# Patient Record
Sex: MALE | Race: WHITE | NOT HISPANIC OR LATINO | Employment: OTHER | URBAN - METROPOLITAN AREA
[De-identification: names, ages, dates, MRNs, and addresses within clinical notes are randomized per-mention and may not be internally consistent; named-entity substitution may affect disease eponyms.]

---

## 2017-11-14 ENCOUNTER — HISTORICAL (OUTPATIENT)
Dept: ENDOSCOPY | Facility: HOSPITAL | Age: 57
End: 2017-11-14

## 2017-11-14 LAB — CRC RECOMMENDATION EXT: NORMAL

## 2018-04-20 ENCOUNTER — HISTORICAL (OUTPATIENT)
Dept: RADIOLOGY | Facility: HOSPITAL | Age: 58
End: 2018-04-20

## 2018-04-23 ENCOUNTER — HISTORICAL (OUTPATIENT)
Dept: RADIOLOGY | Facility: HOSPITAL | Age: 58
End: 2018-04-23

## 2018-04-27 ENCOUNTER — HISTORICAL (OUTPATIENT)
Dept: RADIOLOGY | Facility: HOSPITAL | Age: 58
End: 2018-04-27

## 2018-05-04 ENCOUNTER — HISTORICAL (OUTPATIENT)
Dept: RADIOLOGY | Facility: HOSPITAL | Age: 58
End: 2018-05-04

## 2018-09-26 ENCOUNTER — HISTORICAL (OUTPATIENT)
Dept: RADIOLOGY | Facility: HOSPITAL | Age: 58
End: 2018-09-26

## 2018-09-26 LAB
ALBUMIN SERPL-MCNC: 4.7 GM/DL (ref 3.4–5)
ALBUMIN/GLOB SERPL: 1 RATIO (ref 1–2)
ALP SERPL-CCNC: 106 UNIT/L (ref 45–117)
ALT SERPL-CCNC: 56 UNIT/L (ref 12–78)
AST SERPL-CCNC: 33 UNIT/L (ref 15–37)
BILIRUB SERPL-MCNC: 0.8 MG/DL (ref 0.2–1)
BILIRUBIN DIRECT+TOT PNL SERPL-MCNC: 0.2 MG/DL
BILIRUBIN DIRECT+TOT PNL SERPL-MCNC: 0.6 MG/DL
BUN SERPL-MCNC: 12 MG/DL (ref 7–18)
CALCIUM SERPL-MCNC: 9 MG/DL (ref 8.5–10.1)
CHLORIDE SERPL-SCNC: 107 MMOL/L (ref 98–107)
CHOLEST SERPL-MCNC: 182 MG/DL
CHOLEST/HDLC SERPL: 4.7 {RATIO} (ref 0–5)
CO2 SERPL-SCNC: 27 MMOL/L (ref 21–32)
CREAT SERPL-MCNC: 0.8 MG/DL (ref 0.6–1.3)
GLOBULIN SER-MCNC: 3.4 GM/ML (ref 2.3–3.5)
GLUCOSE SERPL-MCNC: 111 MG/DL (ref 74–106)
H PYLORI AB SER IA-ACNC: POSITIVE
HDLC SERPL-MCNC: 39 MG/DL
LDLC SERPL CALC-MCNC: 108 MG/DL (ref 0–130)
POTASSIUM SERPL-SCNC: 3.9 MMOL/L (ref 3.5–5.1)
PROT SERPL-MCNC: 8.1 GM/DL (ref 6.4–8.2)
SODIUM SERPL-SCNC: 142 MMOL/L (ref 136–145)
TRIGL SERPL-MCNC: 176 MG/DL
VLDLC SERPL CALC-MCNC: 35 MG/DL

## 2018-11-02 ENCOUNTER — HISTORICAL (OUTPATIENT)
Dept: LAB | Facility: HOSPITAL | Age: 58
End: 2018-11-02

## 2018-11-02 LAB
FT4I SERPL CALC-MCNC: 2.08
PSA SERPL-MCNC: 3.24 NG/ML (ref 0–4)
T3RU NFR SERPL: 31 % (ref 31–39)
T4 SERPL-MCNC: 6.7 MCG/DL (ref 4.7–13.3)
TSH SERPL-ACNC: 1.75 MIU/ML (ref 0.36–3.74)

## 2018-11-06 ENCOUNTER — HISTORICAL (OUTPATIENT)
Dept: RADIOLOGY | Facility: HOSPITAL | Age: 58
End: 2018-11-06

## 2018-11-14 ENCOUNTER — HISTORICAL (OUTPATIENT)
Dept: RADIOLOGY | Facility: HOSPITAL | Age: 58
End: 2018-11-14

## 2018-12-26 ENCOUNTER — HISTORICAL (OUTPATIENT)
Dept: LAB | Facility: HOSPITAL | Age: 58
End: 2018-12-26

## 2018-12-26 LAB
ABS NEUT (OLG): 5.67 X10(3)/MCL (ref 2.1–9.2)
ALBUMIN SERPL-MCNC: 4.3 GM/DL (ref 3.4–5)
ALBUMIN/GLOB SERPL: 1.3 RATIO (ref 1.1–2)
ALP SERPL-CCNC: 119 UNIT/L (ref 50–136)
ALT SERPL-CCNC: 29 UNIT/L (ref 12–78)
AST SERPL-CCNC: 17 UNIT/L (ref 15–37)
BASOPHILS # BLD AUTO: 0 X10(3)/MCL (ref 0–0.2)
BASOPHILS NFR BLD AUTO: 0 %
BILIRUB SERPL-MCNC: 0.4 MG/DL (ref 0.2–1)
BILIRUBIN DIRECT+TOT PNL SERPL-MCNC: 0.1 MG/DL (ref 0–0.5)
BILIRUBIN DIRECT+TOT PNL SERPL-MCNC: 0.3 MG/DL (ref 0–0.8)
BUN SERPL-MCNC: 7 MG/DL (ref 7–18)
CALCIUM SERPL-MCNC: 9.2 MG/DL (ref 8.5–10.1)
CHLORIDE SERPL-SCNC: 105 MMOL/L (ref 98–107)
CO2 SERPL-SCNC: 30 MMOL/L (ref 21–32)
CREAT SERPL-MCNC: 0.79 MG/DL (ref 0.7–1.3)
EOSINOPHIL # BLD AUTO: 0.2 X10(3)/MCL (ref 0–0.9)
EOSINOPHIL NFR BLD AUTO: 3 %
ERYTHROCYTE [DISTWIDTH] IN BLOOD BY AUTOMATED COUNT: 11.9 % (ref 11.5–17)
FERRITIN SERPL-MCNC: 196.3 NG/ML (ref 8–388)
GLOBULIN SER-MCNC: 3.3 GM/DL (ref 2.4–3.5)
GLUCOSE SERPL-MCNC: 100 MG/DL (ref 74–106)
HBV CORE IGM SERPL QL IA: NEGATIVE
HBV SURFACE AG SERPL QL IA: NEGATIVE
HCT VFR BLD AUTO: 44.5 % (ref 42–52)
HCV AB SERPL QL IA: NEGATIVE
HGB BLD-MCNC: 14.7 GM/DL (ref 14–18)
IRON SATN MFR SERPL: 18.3 % (ref 20–50)
IRON SERPL-MCNC: 75 MCG/DL (ref 50–175)
LYMPHOCYTES # BLD AUTO: 0.8 X10(3)/MCL (ref 0.6–4.6)
LYMPHOCYTES NFR BLD AUTO: 11 %
MCH RBC QN AUTO: 30.5 PG (ref 27–31)
MCHC RBC AUTO-ENTMCNC: 33 GM/DL (ref 33–36)
MCV RBC AUTO: 92.3 FL (ref 80–94)
MONOCYTES # BLD AUTO: 0.5 X10(3)/MCL (ref 0.1–1.3)
MONOCYTES NFR BLD AUTO: 6 %
NEUTROPHILS # BLD AUTO: 5.67 X10(3)/MCL (ref 2.1–9.2)
NEUTROPHILS NFR BLD AUTO: 78 %
PLATELET # BLD AUTO: 179 X10(3)/MCL (ref 130–400)
PMV BLD AUTO: 12 FL (ref 9.4–12.4)
POTASSIUM SERPL-SCNC: 4.3 MMOL/L (ref 3.5–5.1)
PROT SERPL-MCNC: 7.6 GM/DL (ref 6.4–8.2)
RBC # BLD AUTO: 4.82 X10(6)/MCL (ref 4.7–6.1)
SODIUM SERPL-SCNC: 140 MMOL/L (ref 136–145)
TIBC SERPL-MCNC: 409 MCG/DL (ref 250–450)
TRANSFERRIN SERPL-MCNC: 295 MG/DL (ref 200–360)
WBC # SPEC AUTO: 7.3 X10(3)/MCL (ref 4.5–11.5)

## 2019-01-16 ENCOUNTER — HISTORICAL (OUTPATIENT)
Dept: ADMINISTRATIVE | Facility: HOSPITAL | Age: 59
End: 2019-01-16

## 2019-01-16 LAB
ABS NEUT (OLG): 5.75 X10(3)/MCL (ref 2.1–9.2)
ALBUMIN SERPL-MCNC: 4.2 GM/DL (ref 3.4–5)
ALBUMIN/GLOB SERPL: 1 RATIO (ref 1–2)
ALP SERPL-CCNC: 106 UNIT/L (ref 45–117)
ALT SERPL-CCNC: 28 UNIT/L (ref 12–78)
APPEARANCE, UA: CLEAR
AST SERPL-CCNC: 12 UNIT/L (ref 15–37)
BACTERIA #/AREA URNS AUTO: ABNORMAL /[HPF]
BASOPHILS # BLD AUTO: 0.03 X10(3)/MCL
BASOPHILS NFR BLD AUTO: 0 %
BILIRUB SERPL-MCNC: 0.4 MG/DL (ref 0.2–1)
BILIRUB UR QL STRIP: NEGATIVE
BILIRUBIN DIRECT+TOT PNL SERPL-MCNC: 0.1 MG/DL
BILIRUBIN DIRECT+TOT PNL SERPL-MCNC: 0.3 MG/DL
BUN SERPL-MCNC: 8 MG/DL (ref 7–18)
CALCIUM SERPL-MCNC: 8.8 MG/DL (ref 8.5–10.1)
CHLORIDE SERPL-SCNC: 105 MMOL/L (ref 98–107)
CHOLEST SERPL-MCNC: 184 MG/DL
CHOLEST/HDLC SERPL: 4.2 {RATIO} (ref 0–5)
CO2 SERPL-SCNC: 31 MMOL/L (ref 21–32)
COLOR UR: YELLOW
CREAT SERPL-MCNC: 0.8 MG/DL (ref 0.6–1.3)
CREAT UR-MCNC: 220 MG/DL
EOSINOPHIL # BLD AUTO: 0.61 X10(3)/MCL
EOSINOPHIL NFR BLD AUTO: 8 %
ERYTHROCYTE [DISTWIDTH] IN BLOOD BY AUTOMATED COUNT: 12.2 % (ref 11.5–14.5)
EST. AVERAGE GLUCOSE BLD GHB EST-MCNC: 108 MG/DL
GLOBULIN SER-MCNC: 3.5 GM/ML (ref 2.3–3.5)
GLUCOSE (UA): NORMAL
GLUCOSE SERPL-MCNC: 106 MG/DL (ref 74–106)
HAV IGM SERPL QL IA: NONREACTIVE
HBA1C MFR BLD: 5.4 % (ref 4.2–6.3)
HBV CORE IGM SERPL QL IA: NONREACTIVE
HBV SURFACE AG SERPL QL IA: NEGATIVE
HCT VFR BLD AUTO: 43.9 % (ref 40–51)
HCV AB SERPL QL IA: NONREACTIVE
HDLC SERPL-MCNC: 44 MG/DL
HGB BLD-MCNC: 15 GM/DL (ref 13.5–17.5)
HGB UR QL STRIP: NEGATIVE
HIV 1+2 AB+HIV1 P24 AG SERPL QL IA: NONREACTIVE
HYALINE CASTS #/AREA URNS LPF: ABNORMAL /[LPF]
IMM GRANULOCYTES # BLD AUTO: 0.02 10*3/UL
IMM GRANULOCYTES NFR BLD AUTO: 0 %
KETONES UR QL STRIP: NEGATIVE
LDLC SERPL CALC-MCNC: 124 MG/DL (ref 0–130)
LEUKOCYTE ESTERASE UR QL STRIP: NEGATIVE
LYMPHOCYTES # BLD AUTO: 0.95 X10(3)/MCL
LYMPHOCYTES NFR BLD AUTO: 12 % (ref 13–40)
MCH RBC QN AUTO: 31.1 PG (ref 26–34)
MCHC RBC AUTO-ENTMCNC: 34.2 GM/DL (ref 31–37)
MCV RBC AUTO: 90.9 FL (ref 80–100)
MICROALBUMIN UR-MCNC: 7.6 MG/L (ref 0–19)
MICROALBUMIN/CREAT RATIO PNL UR: 3.5 MCG/MG CR (ref 0–29)
MONOCYTES # BLD AUTO: 0.41 X10(3)/MCL
MONOCYTES NFR BLD AUTO: 5 % (ref 4–12)
NEUTROPHILS # BLD AUTO: 5.75 X10(3)/MCL
NEUTROPHILS NFR BLD AUTO: 74 X10(3)/MCL
NITRITE UR QL STRIP: NEGATIVE
PH UR STRIP: 7.5 [PH] (ref 4.5–8)
PLATELET # BLD AUTO: 149 X10(3)/MCL (ref 130–400)
PMV BLD AUTO: 12.4 FL (ref 7.4–10.4)
POTASSIUM SERPL-SCNC: 4.1 MMOL/L (ref 3.5–5.1)
PROT SERPL-MCNC: 7.7 GM/DL (ref 6.4–8.2)
PROT UR QL STRIP: 10 MG/DL
PSA SERPL-MCNC: 3 NG/ML
RBC # BLD AUTO: 4.83 X10(6)/MCL (ref 4.5–5.9)
RBC #/AREA URNS AUTO: ABNORMAL /[HPF]
SODIUM SERPL-SCNC: 139 MMOL/L (ref 136–145)
SP GR UR STRIP: 1.02 (ref 1–1.03)
SQUAMOUS #/AREA URNS LPF: ABNORMAL /[LPF]
TRIGL SERPL-MCNC: 81 MG/DL
TSH SERPL-ACNC: 2.06 MIU/L (ref 0.36–3.74)
UROBILINOGEN UR STRIP-ACNC: NORMAL
VLDLC SERPL CALC-MCNC: 16 MG/DL
WBC # SPEC AUTO: 7.8 X10(3)/MCL (ref 4.5–11)
WBC #/AREA URNS AUTO: ABNORMAL /HPF

## 2019-03-01 ENCOUNTER — HISTORICAL (OUTPATIENT)
Dept: LAB | Facility: HOSPITAL | Age: 59
End: 2019-03-01

## 2019-04-03 ENCOUNTER — HISTORICAL (OUTPATIENT)
Dept: INTERNAL MEDICINE | Facility: CLINIC | Age: 59
End: 2019-04-03

## 2019-04-03 LAB
PSA SERPL-MCNC: 2.9 NG/ML
T4 FREE SERPL-MCNC: 0.69 NG/DL (ref 0.76–1.46)
TSH SERPL-ACNC: 3.8 MIU/L (ref 0.36–3.74)

## 2019-06-04 ENCOUNTER — HISTORICAL (OUTPATIENT)
Dept: INTERNAL MEDICINE | Facility: CLINIC | Age: 59
End: 2019-06-04

## 2019-06-04 LAB
T3FREE SERPL-MCNC: 2.61 PG/ML (ref 2.18–3.98)
T4 FREE SERPL-MCNC: 0.79 NG/DL (ref 0.76–1.46)
TSH SERPL-ACNC: 1.41 MIU/L (ref 0.36–3.74)

## 2019-07-19 ENCOUNTER — HISTORICAL (OUTPATIENT)
Dept: RADIOLOGY | Facility: HOSPITAL | Age: 59
End: 2019-07-19

## 2019-07-24 ENCOUNTER — HISTORICAL (OUTPATIENT)
Dept: ADMINISTRATIVE | Facility: HOSPITAL | Age: 59
End: 2019-07-24

## 2019-07-24 LAB
APPEARANCE, UA: CLEAR
BACTERIA #/AREA URNS AUTO: ABNORMAL /[HPF]
BILIRUB UR QL STRIP: NEGATIVE
COLOR UR: YELLOW
GLUCOSE (UA): NORMAL
HGB UR QL STRIP: NEGATIVE
HYALINE CASTS #/AREA URNS LPF: ABNORMAL /[LPF]
KETONES UR QL STRIP: NEGATIVE
LEUKOCYTE ESTERASE UR QL STRIP: NEGATIVE
NITRITE UR QL STRIP: NEGATIVE
PH UR STRIP: 6 [PH] (ref 4.5–8)
PROT UR QL STRIP: 10 MG/DL
RBC #/AREA URNS AUTO: ABNORMAL /[HPF]
SP GR UR STRIP: 1.02 (ref 1–1.03)
SQUAMOUS #/AREA URNS LPF: ABNORMAL /[LPF]
UROBILINOGEN UR STRIP-ACNC: NORMAL
WBC #/AREA URNS AUTO: ABNORMAL /HPF

## 2019-09-18 ENCOUNTER — HISTORICAL (OUTPATIENT)
Dept: RADIOLOGY | Facility: HOSPITAL | Age: 59
End: 2019-09-18

## 2019-12-04 ENCOUNTER — HISTORICAL (OUTPATIENT)
Dept: INTERNAL MEDICINE | Facility: CLINIC | Age: 59
End: 2019-12-04

## 2019-12-04 LAB
CHOLEST SERPL-MCNC: 206 MG/DL
CHOLEST/HDLC SERPL: 4.8 {RATIO} (ref 0–5)
HDLC SERPL-MCNC: 43 MG/DL (ref 40–59)
LDLC SERPL CALC-MCNC: 136 MG/DL
PSA SERPL-MCNC: 4 NG/ML
TRIGL SERPL-MCNC: 134 MG/DL
TSH SERPL-ACNC: 3.49 MIU/L (ref 0.36–3.74)
VLDLC SERPL CALC-MCNC: 27 MG/DL

## 2019-12-18 ENCOUNTER — HISTORICAL (OUTPATIENT)
Dept: RADIOLOGY | Facility: HOSPITAL | Age: 59
End: 2019-12-18

## 2020-05-16 ENCOUNTER — HISTORICAL (OUTPATIENT)
Dept: LAB | Facility: HOSPITAL | Age: 60
End: 2020-05-16

## 2020-05-16 LAB
ALBUMIN SERPL-MCNC: 4.3 GM/DL (ref 3.4–5)
ALP SERPL-CCNC: 118 UNIT/L (ref 46–116)
ALT SERPL-CCNC: 32 UNIT/L (ref 12–78)
AST SERPL-CCNC: 17 UNIT/L (ref 15–37)
BILIRUB SERPL-MCNC: 0.2 MG/DL (ref 0.2–1)
BILIRUBIN DIRECT+TOT PNL SERPL-MCNC: 0.1 MG/DL (ref 0–0.2)
BILIRUBIN DIRECT+TOT PNL SERPL-MCNC: 0.1 MG/DL (ref 0–0.8)
CHOLEST SERPL-MCNC: 209 MG/DL (ref 0–200)
CHOLEST/HDLC SERPL: 5.1 {RATIO} (ref 0–5)
HDLC SERPL-MCNC: 41 MG/DL (ref 40–60)
LDLC SERPL CALC-MCNC: 110 MG/DL (ref 0–129)
PROT SERPL-MCNC: 7 GM/DL (ref 6.4–8.2)
TRIGL SERPL-MCNC: 289 MG/DL
VLDLC SERPL CALC-MCNC: 58 MG/DL

## 2020-06-03 ENCOUNTER — HISTORICAL (OUTPATIENT)
Dept: INTERNAL MEDICINE | Facility: CLINIC | Age: 60
End: 2020-06-03

## 2020-06-03 LAB
ABS NEUT (OLG): 8.11 X10(3)/MCL (ref 2.1–9.2)
ALBUMIN SERPL-MCNC: 4.1 GM/DL (ref 3.4–5)
ALBUMIN/GLOB SERPL: 1.2 RATIO (ref 1.1–2)
ALP SERPL-CCNC: 122 UNIT/L (ref 45–117)
ALT SERPL-CCNC: 36 UNIT/L (ref 12–78)
AST SERPL-CCNC: 22 UNIT/L (ref 15–37)
BASOPHILS # BLD AUTO: 0 X10(3)/MCL (ref 0–0.2)
BASOPHILS NFR BLD AUTO: 0 %
BILIRUB SERPL-MCNC: 0.3 MG/DL (ref 0.2–1)
BILIRUBIN DIRECT+TOT PNL SERPL-MCNC: <0.1 MG/DL (ref 0–0.2)
BILIRUBIN DIRECT+TOT PNL SERPL-MCNC: ABNORMAL MG/DL
BUN SERPL-MCNC: 9 MG/DL (ref 7–18)
CALCIUM SERPL-MCNC: 8.8 MG/DL (ref 8.5–10.1)
CHLORIDE SERPL-SCNC: 107 MMOL/L (ref 98–107)
CHOLEST SERPL-MCNC: 176 MG/DL
CHOLEST/HDLC SERPL: 4.9 {RATIO} (ref 0–5)
CO2 SERPL-SCNC: 28 MMOL/L (ref 21–32)
CREAT SERPL-MCNC: 0.9 MG/DL (ref 0.6–1.3)
CREAT UR-MCNC: 84 MG/DL
EOSINOPHIL # BLD AUTO: 0.8 X10(3)/MCL (ref 0–0.9)
EOSINOPHIL NFR BLD AUTO: 8 %
ERYTHROCYTE [DISTWIDTH] IN BLOOD BY AUTOMATED COUNT: 12.3 % (ref 11.5–14.5)
EST. AVERAGE GLUCOSE BLD GHB EST-MCNC: 108 MG/DL
GLOBULIN SER-MCNC: 3.3 GM/ML (ref 2.3–3.5)
GLUCOSE SERPL-MCNC: 122 MG/DL (ref 74–106)
HAV IGM SERPL QL IA: NONREACTIVE
HBA1C MFR BLD: 5.4 % (ref 4.2–6.3)
HBV CORE IGM SERPL QL IA: NONREACTIVE
HBV SURFACE AG SERPL QL IA: NONREACTIVE
HCT VFR BLD AUTO: 45.2 % (ref 40–51)
HCV AB SERPL QL IA: NONREACTIVE
HDLC SERPL-MCNC: 36 MG/DL (ref 40–59)
HGB BLD-MCNC: 15.4 GM/DL (ref 13.5–17.5)
HIV 1+2 AB+HIV1 P24 AG SERPL QL IA: NONREACTIVE
IMM GRANULOCYTES # BLD AUTO: 0.03 10*3/UL
IMM GRANULOCYTES NFR BLD AUTO: 0 %
LDLC SERPL CALC-MCNC: ABNORMAL MG/DL
LYMPHOCYTES # BLD AUTO: 1.4 X10(3)/MCL (ref 0.6–4.6)
LYMPHOCYTES NFR BLD AUTO: 12 %
MCH RBC QN AUTO: 31.8 PG (ref 26–34)
MCHC RBC AUTO-ENTMCNC: 34.1 GM/DL (ref 31–37)
MCV RBC AUTO: 93.4 FL (ref 80–100)
MICROALBUMIN UR-MCNC: 5.8 MG/L (ref 0–19)
MICROALBUMIN/CREAT RATIO PNL UR: 6.9 MCG/MG CR (ref 0–29)
MONOCYTES # BLD AUTO: 0.7 X10(3)/MCL (ref 0.1–1.3)
MONOCYTES NFR BLD AUTO: 6 %
NEUTROPHILS # BLD AUTO: 8.11 X10(3)/MCL (ref 2.1–9.2)
NEUTROPHILS NFR BLD AUTO: 73 %
PLATELET # BLD AUTO: 154 X10(3)/MCL (ref 130–400)
PMV BLD AUTO: 12.3 FL (ref 7.4–10.4)
POTASSIUM SERPL-SCNC: 3.8 MMOL/L (ref 3.5–5.1)
PROT SERPL-MCNC: 7.4 GM/DL (ref 6.4–8.2)
RBC # BLD AUTO: 4.84 X10(6)/MCL (ref 4.5–5.9)
SODIUM SERPL-SCNC: 140 MMOL/L (ref 136–145)
TRIGL SERPL-MCNC: 413 MG/DL
TSH SERPL-ACNC: 3.08 MIU/L (ref 0.36–3.74)
VLDLC SERPL CALC-MCNC: ABNORMAL MG/DL
WBC # SPEC AUTO: 11.1 X10(3)/MCL (ref 4.5–11)

## 2020-09-07 ENCOUNTER — HISTORICAL (OUTPATIENT)
Dept: INTERNAL MEDICINE | Facility: CLINIC | Age: 60
End: 2020-09-07

## 2020-09-07 LAB
CHOLEST SERPL-MCNC: 205 MG/DL
CHOLEST/HDLC SERPL: 4.8 {RATIO} (ref 0–5)
HDLC SERPL-MCNC: 43 MG/DL (ref 40–59)
LDLC SERPL CALC-MCNC: 141 MG/DL
TRIGL SERPL-MCNC: 105 MG/DL
VLDLC SERPL CALC-MCNC: 21 MG/DL

## 2021-03-01 ENCOUNTER — HISTORICAL (OUTPATIENT)
Dept: LAB | Facility: HOSPITAL | Age: 61
End: 2021-03-01

## 2021-03-01 LAB
ALBUMIN SERPL-MCNC: 4.7 GM/DL (ref 3.5–5)
ALP SERPL-CCNC: 109 UNIT/L (ref 40–150)
ALT SERPL-CCNC: 25 UNIT/L (ref 0–55)
AST SERPL-CCNC: 14 UNIT/L (ref 5–34)
BILIRUB SERPL-MCNC: 0.3 MG/DL
BILIRUBIN DIRECT+TOT PNL SERPL-MCNC: 0.1 MG/DL (ref 0–0.5)
BILIRUBIN DIRECT+TOT PNL SERPL-MCNC: 0.2 MG/DL (ref 0–0.8)
BUN SERPL-MCNC: 9.2 MG/DL (ref 8.4–25.7)
CALCIUM SERPL-MCNC: 9.2 MG/DL (ref 8.4–10.2)
CHLORIDE SERPL-SCNC: 106 MMOL/L (ref 98–107)
CHOLEST SERPL-MCNC: 168 MG/DL
CHOLEST/HDLC SERPL: 4 {RATIO} (ref 0–5)
CO2 SERPL-SCNC: 27 MMOL/L (ref 22–29)
CREAT SERPL-MCNC: 0.79 MG/DL (ref 0.73–1.18)
CREAT/UREA NIT SERPL: 12
GLUCOSE SERPL-MCNC: 99 MG/DL (ref 74–100)
HDLC SERPL-MCNC: 43 MG/DL (ref 35–60)
LDLC SERPL CALC-MCNC: 101 MG/DL (ref 50–140)
POTASSIUM SERPL-SCNC: 4 MMOL/L (ref 3.5–5.1)
PROT SERPL-MCNC: 7.3 GM/DL (ref 6.4–8.3)
SODIUM SERPL-SCNC: 143 MMOL/L (ref 136–145)
TRIGL SERPL-MCNC: 118 MG/DL (ref 34–140)
VLDLC SERPL CALC-MCNC: 24 MG/DL

## 2021-03-19 ENCOUNTER — HISTORICAL (OUTPATIENT)
Dept: RADIOLOGY | Facility: HOSPITAL | Age: 61
End: 2021-03-19

## 2021-06-16 ENCOUNTER — HISTORICAL (OUTPATIENT)
Dept: PHYSICAL THERAPY | Facility: HOSPITAL | Age: 61
End: 2021-06-16

## 2021-06-17 ENCOUNTER — HISTORICAL (OUTPATIENT)
Dept: ADMINISTRATIVE | Facility: HOSPITAL | Age: 61
End: 2021-06-17

## 2021-06-18 ENCOUNTER — HISTORICAL (OUTPATIENT)
Dept: ADMINISTRATIVE | Facility: HOSPITAL | Age: 61
End: 2021-06-18

## 2021-06-20 LAB — FINAL CULTURE: NORMAL

## 2021-06-21 ENCOUNTER — HISTORICAL (OUTPATIENT)
Dept: PHYSICAL THERAPY | Facility: HOSPITAL | Age: 61
End: 2021-06-21

## 2021-06-22 ENCOUNTER — HISTORICAL (OUTPATIENT)
Dept: RADIOLOGY | Facility: HOSPITAL | Age: 61
End: 2021-06-22

## 2021-06-23 ENCOUNTER — HISTORICAL (OUTPATIENT)
Dept: PHYSICAL THERAPY | Facility: HOSPITAL | Age: 61
End: 2021-06-23

## 2021-06-29 ENCOUNTER — HISTORICAL (OUTPATIENT)
Dept: PHYSICAL THERAPY | Facility: HOSPITAL | Age: 61
End: 2021-06-29

## 2022-01-03 ENCOUNTER — HISTORICAL (OUTPATIENT)
Dept: INTERNAL MEDICINE | Facility: CLINIC | Age: 62
End: 2022-01-03

## 2022-01-03 LAB
ABS NEUT (OLG): 6.29 X10(3)/MCL (ref 2.1–9.2)
ALBUMIN SERPL-MCNC: 4.9 GM/DL (ref 3.5–5)
ALBUMIN/GLOB SERPL: 1.6 RATIO (ref 1.1–2)
ALP SERPL-CCNC: 95 UNIT/L (ref 40–150)
ALT SERPL-CCNC: 17 UNIT/L (ref 0–55)
AST SERPL-CCNC: 14 UNIT/L (ref 5–34)
BASOPHILS # BLD AUTO: 0 X10(3)/MCL (ref 0–0.2)
BASOPHILS NFR BLD AUTO: 0 %
BILIRUB SERPL-MCNC: 0.9 MG/DL
BILIRUBIN DIRECT+TOT PNL SERPL-MCNC: 0.4 MG/DL (ref 0–0.5)
BILIRUBIN DIRECT+TOT PNL SERPL-MCNC: 0.5 MG/DL (ref 0–0.8)
BUN SERPL-MCNC: 10.1 MG/DL (ref 8.4–25.7)
CALCIUM SERPL-MCNC: 10.1 MG/DL (ref 8.7–10.5)
CHLORIDE SERPL-SCNC: 106 MMOL/L (ref 98–107)
CHOLEST SERPL-MCNC: 142 MG/DL
CHOLEST/HDLC SERPL: 4 {RATIO} (ref 0–5)
CO2 SERPL-SCNC: 26 MMOL/L (ref 22–29)
CREAT SERPL-MCNC: 0.87 MG/DL (ref 0.73–1.18)
EOSINOPHIL # BLD AUTO: 0.3 X10(3)/MCL (ref 0–0.9)
EOSINOPHIL NFR BLD AUTO: 4 %
ERYTHROCYTE [DISTWIDTH] IN BLOOD BY AUTOMATED COUNT: 11.6 % (ref 11.5–14.5)
EST. AVERAGE GLUCOSE BLD GHB EST-MCNC: 102.5 MG/DL
GLOBULIN SER-MCNC: 3 GM/DL (ref 2.4–3.5)
GLUCOSE SERPL-MCNC: 126 MG/DL (ref 74–100)
HBA1C MFR BLD: 5.2 %
HCT VFR BLD AUTO: 46.8 % (ref 40–51)
HDLC SERPL-MCNC: 39 MG/DL (ref 35–60)
HGB BLD-MCNC: 16.1 GM/DL (ref 13.5–17.5)
IMM GRANULOCYTES # BLD AUTO: 0.02 10*3/UL
IMM GRANULOCYTES NFR BLD AUTO: 0 %
LDLC SERPL CALC-MCNC: 89 MG/DL (ref 50–140)
LYMPHOCYTES # BLD AUTO: 1.3 X10(3)/MCL (ref 0.6–4.6)
LYMPHOCYTES NFR BLD AUTO: 16 %
MCH RBC QN AUTO: 31.1 PG (ref 26–34)
MCHC RBC AUTO-ENTMCNC: 34.4 GM/DL (ref 31–37)
MCV RBC AUTO: 90.3 FL (ref 80–100)
MONOCYTES # BLD AUTO: 0.6 X10(3)/MCL (ref 0.1–1.3)
MONOCYTES NFR BLD AUTO: 6 %
NEUTROPHILS # BLD AUTO: 6.29 X10(3)/MCL (ref 2.1–9.2)
NEUTROPHILS NFR BLD AUTO: 74 %
NRBC BLD AUTO-RTO: 0 % (ref 0–0.2)
PLATELET # BLD AUTO: 161 X10(3)/MCL (ref 130–400)
PMV BLD AUTO: 11.4 FL (ref 7.4–10.4)
POTASSIUM SERPL-SCNC: 4.3 MMOL/L (ref 3.5–5.1)
PROT SERPL-MCNC: 7.9 GM/DL (ref 6.4–8.3)
PSA SERPL-MCNC: 2.6 NG/ML
RBC # BLD AUTO: 5.18 X10(6)/MCL (ref 4.5–5.9)
SODIUM SERPL-SCNC: 141 MMOL/L (ref 136–145)
TRIGL SERPL-MCNC: 70 MG/DL (ref 34–140)
TSH SERPL-ACNC: 3.28 UIU/ML (ref 0.35–4.94)
VLDLC SERPL CALC-MCNC: 14 MG/DL
WBC # SPEC AUTO: 8.6 X10(3)/MCL (ref 4.5–11)

## 2022-03-14 ENCOUNTER — HISTORICAL (OUTPATIENT)
Dept: ADMINISTRATIVE | Facility: HOSPITAL | Age: 62
End: 2022-03-14

## 2022-03-18 ENCOUNTER — HISTORICAL (OUTPATIENT)
Dept: RADIOLOGY | Facility: HOSPITAL | Age: 62
End: 2022-03-18

## 2022-04-10 ENCOUNTER — HISTORICAL (OUTPATIENT)
Dept: ADMINISTRATIVE | Facility: HOSPITAL | Age: 62
End: 2022-04-10
Payer: MEDICARE

## 2022-04-22 ENCOUNTER — HISTORICAL (OUTPATIENT)
Dept: LAB | Facility: HOSPITAL | Age: 62
End: 2022-04-22
Payer: MEDICARE

## 2022-04-22 LAB
BUN SERPL-MCNC: 7.1 MG/DL (ref 8.4–25.7)
CALCIUM SERPL-MCNC: 9.6 MG/DL (ref 8.7–10.5)
CHLORIDE SERPL-SCNC: 105 MMOL/L (ref 98–107)
CO2 SERPL-SCNC: 26 MMOL/L (ref 22–29)
CREAT SERPL-MCNC: 0.82 MG/DL (ref 0.73–1.18)
CREAT/UREA NIT SERPL: 9
ERYTHROCYTE [DISTWIDTH] IN BLOOD BY AUTOMATED COUNT: 11.7 % (ref 11.5–17)
GLUCOSE SERPL-MCNC: 113 MG/DL (ref 74–100)
HCT VFR BLD AUTO: 43.5 % (ref 42–52)
HEMOLYSIS INTERF INDEX SERPL-ACNC: 2
HGB BLD-MCNC: 14.4 G/DL (ref 14–18)
ICTERIC INTERF INDEX SERPL-ACNC: 0
LIPEMIC INTERF INDEX SERPL-ACNC: 0
MCH RBC QN AUTO: 30.6 PG (ref 27–31)
MCHC RBC AUTO-ENTMCNC: 33.1 G/DL (ref 33–36)
MCV RBC AUTO: 92.4 FL (ref 80–94)
PLATELET # BLD AUTO: 130 10*3/UL (ref 130–400)
PMV BLD AUTO: 12.3 FL (ref 9.4–12.4)
POTASSIUM SERPL-SCNC: 4.4 MMOL/L (ref 3.5–5.1)
RBC # BLD AUTO: 4.71 10*6/UL (ref 4.7–6.1)
SODIUM SERPL-SCNC: 144 MMOL/L (ref 136–145)
WBC # SPEC AUTO: 8.3 10*3/UL (ref 4.5–11.5)

## 2022-04-27 VITALS
SYSTOLIC BLOOD PRESSURE: 128 MMHG | DIASTOLIC BLOOD PRESSURE: 78 MMHG | HEIGHT: 71 IN | OXYGEN SATURATION: 98 % | BODY MASS INDEX: 25 KG/M2 | WEIGHT: 178.56 LBS

## 2022-04-28 ENCOUNTER — HISTORICAL (OUTPATIENT)
Dept: SURGERY | Facility: HOSPITAL | Age: 62
End: 2022-04-28
Payer: MEDICARE

## 2022-05-01 ENCOUNTER — TELEPHONE (OUTPATIENT)
Dept: INTERNAL MEDICINE | Facility: CLINIC | Age: 62
End: 2022-05-01
Payer: MEDICARE

## 2022-05-04 NOTE — HISTORICAL [facility] CERNER
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 ??Due?  ??? ? ? ?Medicare Annual Wellness Exam due??06/17/21??and every 1??year(s)  ??? ? ? ?Zoster Vaccine due??06/17/21??Unknown Frequency  ??? ??Due In Future?  ??? ? ? ?Obesity Screening not due until??01/01/22??and every 1??year(s)  ??? ? ? ?Hypertension Management-BMP not due until??03/01/22??and every 1??year(s)  ???Satisfied?(in the past 1 year)  ??? ??Satisfied?  ??? ? ? ?ADL Screening on??06/17/21.??Satisfied by Baylee Geiger LPN  ??? ? ? ?Alcohol Misuse Screening on??09/08/20.??Satisfied by Baylee Geiger LPN  ??? ? ? ?Blood Pressure Screening on??06/17/21.??Satisfied by Baylee Geiger LPN  ??? ? ? ?Body Mass Index Check on??06/17/21.??Satisfied by Baylee Geiger LPN  ??? ? ? ?Depression Screening on??06/17/21.??Satisfied by Baylee Geiger LPN  ??? ? ? ?Diabetes Screening on??03/01/21.??Satisfied by Samreen Mccullough  ??? ? ? ?Hypertension Management-Education on??06/17/21.??Satisfied by Lea Azul??Reason: Expectation Satisfied Elsewhere  ??? ? ? ?Influenza Vaccine  on??04/26/21.??Satisfied by Lolly Stanton  ??? ? ? ?Lipid Screening on??03/01/21.??Satisfied by Samreen Mccullough  ??? ? ? ?Obesity Screening on??06/17/21.??Satisfied by Baylee Geiger LPN  ?  Lab Results  Test Name Test Result Date/Time   Sodium Lvl 143 mmol/L 03/01/2021 09:50 CST   Potassium Lvl 4.0 mmol/L 03/01/2021 09:50 CST   Chloride 106 mmol/L 03/01/2021 09:50 CST   CO2 27 mmol/L 03/01/2021 09:50 CST   Calcium Lvl 9.2 mg/dL 03/01/2021 09:50 CST   Glucose Lvl 99 mg/dL 03/01/2021 09:50 CST   BUN 9.2 mg/dL 03/01/2021 09:50 CST   Creatinine 0.79 mg/dL 03/01/2021 09:50 CST   BUN/Creat Ratio 12 03/01/2021 09:50 CST   eGFR-AA >60 03/01/2021 09:50 CST   eGFR-ORAL >60 mL/min/1.73 m2 03/01/2021 09:50 CST   Bili Total 0.3 mg/dL 03/01/2021 09:50 CST   Bili Direct 0.1 mg/dL 03/01/2021 09:50 CST   Bili Indirect 0.20 mg/dL 03/01/2021 09:50 CST   AST 14 unit/L 03/01/2021 09:50 CST   ALT 25 unit/L 03/01/2021 09:50 CST   Alk Phos 109 unit/L 03/01/2021 09:50 CST   Total Protein 7.3 gm/dL 03/01/2021 09:50 CST   Albumin Lvl 4.7 gm/dL 03/01/2021 09:50 CST   Chol 168 mg/dL 03/01/2021 09:50 CST   HDL 43 mg/dL 03/01/2021 09:50 CST   Trig 118 mg/dL 03/01/2021 09:50 CST   .00 mg/dL 03/01/2021 09:50 CST   Chol/HDL 4 03/01/2021 09:50 CST   VLDL 24 03/01/2021 09:50 CST       Diarrhea  Educated on eating bland foods, avoid greasy/ fried foods and return to normal eating habits  XR abdomen without abnormal findings

## 2022-06-30 ENCOUNTER — TELEPHONE (OUTPATIENT)
Dept: INTERNAL MEDICINE | Facility: CLINIC | Age: 62
End: 2022-06-30
Payer: MEDICARE

## 2022-06-30 NOTE — TELEPHONE ENCOUNTER
Pt called to see if he needs blood work done before his visit on 07/06/2022, there are no future orders in the system.

## 2022-07-05 ENCOUNTER — TELEPHONE (OUTPATIENT)
Dept: ADMINISTRATIVE | Facility: HOSPITAL | Age: 62
End: 2022-07-05
Payer: MEDICARE

## 2022-07-19 ENCOUNTER — TELEPHONE (OUTPATIENT)
Dept: INTERNAL MEDICINE | Facility: CLINIC | Age: 62
End: 2022-07-19
Payer: MEDICARE

## 2022-07-19 ENCOUNTER — LAB VISIT (OUTPATIENT)
Dept: LAB | Facility: HOSPITAL | Age: 62
End: 2022-07-19
Attending: NURSE PRACTITIONER
Payer: MEDICARE

## 2022-07-19 DIAGNOSIS — I51.9 MYXEDEMA HEART DISEASE: Primary | ICD-10-CM

## 2022-07-19 DIAGNOSIS — E03.9 MYXEDEMA HEART DISEASE: Primary | ICD-10-CM

## 2022-07-19 LAB — TSH SERPL-ACNC: 2.28 UIU/ML (ref 0.35–4.94)

## 2022-07-19 PROCEDURE — 84443 ASSAY THYROID STIM HORMONE: CPT

## 2022-07-19 PROCEDURE — 36415 COLL VENOUS BLD VENIPUNCTURE: CPT

## 2022-07-19 NOTE — TELEPHONE ENCOUNTER
Yes, please reschedule his appointment for Thursday 7/21/22 (same day) but in the afternoon in virtual audio appointment that is available.     Thank you

## 2022-07-19 NOTE — TELEPHONE ENCOUNTER
Pt wife called stating that he his just got over Covid a week ago and with it still going around they are curious if his appointment can be converted into a telemed

## 2022-07-21 ENCOUNTER — OFFICE VISIT (OUTPATIENT)
Dept: INTERNAL MEDICINE | Facility: CLINIC | Age: 62
End: 2022-07-21
Payer: MEDICARE

## 2022-07-21 DIAGNOSIS — E03.9 HYPOTHYROIDISM, UNSPECIFIED TYPE: ICD-10-CM

## 2022-07-21 DIAGNOSIS — Z00.00 WELL ADULT EXAM: ICD-10-CM

## 2022-07-21 DIAGNOSIS — I65.23 BILATERAL CAROTID ARTERY STENOSIS: ICD-10-CM

## 2022-07-21 DIAGNOSIS — K21.9 GASTROESOPHAGEAL REFLUX DISEASE, UNSPECIFIED WHETHER ESOPHAGITIS PRESENT: ICD-10-CM

## 2022-07-21 DIAGNOSIS — E04.1 THYROID NODULE: Primary | ICD-10-CM

## 2022-07-21 DIAGNOSIS — I10 HYPERTENSION, UNSPECIFIED TYPE: ICD-10-CM

## 2022-07-21 PROBLEM — K57.90 DIVERTICULOSIS: Status: ACTIVE | Noted: 2022-07-21

## 2022-07-21 PROBLEM — K63.5 POLYP OF COLON: Status: ACTIVE | Noted: 2022-07-21

## 2022-07-21 PROBLEM — K76.0 STEATOSIS OF LIVER: Status: ACTIVE | Noted: 2022-07-21

## 2022-07-21 PROBLEM — J30.9 ALLERGIC RHINITIS: Status: ACTIVE | Noted: 2022-07-21

## 2022-07-21 PROBLEM — E78.5 HYPERLIPIDEMIA: Status: ACTIVE | Noted: 2022-07-21

## 2022-07-21 PROBLEM — E66.9 OBESITY: Status: ACTIVE | Noted: 2022-07-21

## 2022-07-21 PROBLEM — Z91.89 AT RISK OF DISEASE: Status: RESOLVED | Noted: 2022-07-21 | Resolved: 2022-07-21

## 2022-07-21 PROBLEM — N13.8 BENIGN PROSTATIC HYPERPLASIA WITH URINARY OBSTRUCTION: Status: ACTIVE | Noted: 2022-07-21

## 2022-07-21 PROBLEM — Z91.89 AT RISK OF DISEASE: Status: ACTIVE | Noted: 2022-07-21

## 2022-07-21 PROBLEM — N41.1 CHRONIC PROSTATITIS: Status: ACTIVE | Noted: 2022-07-21

## 2022-07-21 PROBLEM — N40.1 BENIGN PROSTATIC HYPERPLASIA WITH URINARY OBSTRUCTION: Status: ACTIVE | Noted: 2022-07-21

## 2022-07-21 PROBLEM — R13.10 DYSPHAGIA: Status: ACTIVE | Noted: 2022-07-21

## 2022-07-21 PROBLEM — K64.8 INTERNAL HEMORRHOIDS: Status: ACTIVE | Noted: 2022-07-21

## 2022-07-21 PROCEDURE — 1160F RVW MEDS BY RX/DR IN RCRD: CPT | Mod: CPTII,95,, | Performed by: NURSE PRACTITIONER

## 2022-07-21 PROCEDURE — 99441 PR PHYSICIAN TELEPHONE EVALUATION 5-10 MIN: ICD-10-PCS | Mod: 95,,, | Performed by: NURSE PRACTITIONER

## 2022-07-21 PROCEDURE — 1160F PR REVIEW ALL MEDS BY PRESCRIBER/CLIN PHARMACIST DOCUMENTED: ICD-10-PCS | Mod: CPTII,95,, | Performed by: NURSE PRACTITIONER

## 2022-07-21 PROCEDURE — 1159F PR MEDICATION LIST DOCUMENTED IN MEDICAL RECORD: ICD-10-PCS | Mod: CPTII,95,, | Performed by: NURSE PRACTITIONER

## 2022-07-21 PROCEDURE — 1159F MED LIST DOCD IN RCRD: CPT | Mod: CPTII,95,, | Performed by: NURSE PRACTITIONER

## 2022-07-21 PROCEDURE — 99441 PR PHYSICIAN TELEPHONE EVALUATION 5-10 MIN: CPT | Mod: 95,,, | Performed by: NURSE PRACTITIONER

## 2022-07-21 RX ORDER — LEVOTHYROXINE SODIUM 50 UG/1
50 TABLET ORAL DAILY
COMMUNITY
Start: 2022-07-08 | End: 2022-07-21

## 2022-07-21 RX ORDER — LIDOCAINE 50 MG/G
CREAM TOPICAL
Status: ON HOLD | COMMUNITY
Start: 2022-04-07 | End: 2023-06-01 | Stop reason: HOSPADM

## 2022-07-21 RX ORDER — TRIPROLIDINE/PSEUDOEPHEDRINE 2.5MG-60MG
30 TABLET ORAL EVERY 8 HOURS PRN
Qty: 900 ML | Refills: 11 | Status: SHIPPED | OUTPATIENT
Start: 2022-07-21 | End: 2023-06-20 | Stop reason: SDUPTHER

## 2022-07-21 RX ORDER — ATORVASTATIN CALCIUM 20 MG/1
20 TABLET, FILM COATED ORAL DAILY
COMMUNITY
Start: 2022-06-04 | End: 2022-07-21 | Stop reason: SDUPTHER

## 2022-07-21 RX ORDER — LEVOTHYROXINE SODIUM 50 UG/1
50 TABLET ORAL
Qty: 90 TABLET | Refills: 3 | Status: SHIPPED | OUTPATIENT
Start: 2022-07-21 | End: 2022-12-20 | Stop reason: SDUPTHER

## 2022-07-21 RX ORDER — FLUTICASONE PROPIONATE 50 MCG
SPRAY, SUSPENSION (ML) NASAL
COMMUNITY
Start: 2022-01-04 | End: 2022-12-20 | Stop reason: SDUPTHER

## 2022-07-21 RX ORDER — SULFAMETHOXAZOLE AND TRIMETHOPRIM 200; 40 MG/5ML; MG/5ML
5 SUSPENSION ORAL DAILY
COMMUNITY
Start: 2022-07-14 | End: 2023-06-20

## 2022-07-21 RX ORDER — AMLODIPINE BESYLATE 10 MG/1
10 TABLET ORAL DAILY
Qty: 90 TABLET | Refills: 3 | Status: SHIPPED | OUTPATIENT
Start: 2022-07-21 | End: 2023-06-20 | Stop reason: SDUPTHER

## 2022-07-21 RX ORDER — ESOMEPRAZOLE MAGNESIUM 40 MG/1
GRANULE, DELAYED RELEASE ORAL
COMMUNITY
Start: 2022-07-05 | End: 2022-12-20 | Stop reason: SDUPTHER

## 2022-07-21 RX ORDER — AZELASTINE 1 MG/ML
SPRAY, METERED NASAL
COMMUNITY
Start: 2022-01-04 | End: 2022-12-20 | Stop reason: SDUPTHER

## 2022-07-21 RX ORDER — DOXAZOSIN 1 MG/1
TABLET ORAL
COMMUNITY
Start: 2022-07-08 | End: 2022-10-04

## 2022-07-21 RX ORDER — AMLODIPINE BESYLATE 10 MG/1
10 TABLET ORAL DAILY
COMMUNITY
Start: 2022-06-04 | End: 2022-07-21 | Stop reason: SDUPTHER

## 2022-07-21 RX ORDER — CETIRIZINE HYDROCHLORIDE 10 MG/1
10 TABLET ORAL DAILY
COMMUNITY
Start: 2022-06-05 | End: 2022-12-20 | Stop reason: SDUPTHER

## 2022-07-21 RX ORDER — ATORVASTATIN CALCIUM 20 MG/1
20 TABLET, FILM COATED ORAL NIGHTLY
Qty: 90 TABLET | Refills: 3 | Status: SHIPPED | OUTPATIENT
Start: 2022-07-21 | End: 2022-10-04

## 2022-07-21 NOTE — PROGRESS NOTES
Established Patient - Audio Only Telehealth Visit     The patient location is: parking lot, stopped in vehicle  The chief complaint leading to consultation is: lab results  Visit type: Virtual visit with audio only (telephone)  Total time spent with patient: 9 minutes       The reason for the audio only service rather than synchronous audio and video virtual visit was related to technical difficulties or patient preference/necessity.     Each patient to whom I provide medical services by telemedicine is:  (1) informed of the relationship between the physician and patient and the respective role of any other health care provider with respect to management of the patient; and (2) notified that they may decline to receive medical services by telemedicine and may withdraw from such care at any time. Patient verbally consented to receive this service via voice-only telephone call.       HPI:   61 yo WM for follow up for lab results for hypothyroidism. TSH 2.282. Had COVID19 a few weeks ago. Feeling better though. He has upcoming appointment with Cardiologist for US. He had EGD with dilation with Dr. Hunter and has another appointment coming up soon. Due for follow up with ENT and will contact for an appointment. He continues to see Dr. Reis (Urologist). He needs medication refills. Denies any other concerns/ complaints today.     Assessment and plan:    Problem List Items Addressed This Visit        Cardiac/Vascular    Bilateral carotid artery stenosis    Current Assessment & Plan     April 2022 with b/l carotid stenosis 60-79%, has follow up with Cardiologist with repeat US  Encouraged to keep f/u with Cardiologist and continue antihypertensives and statin medication as prescribed           Relevant Medications    amLODIPine (NORVASC) 10 MG tablet    atorvastatin (LIPITOR) 20 MG tablet       Endocrine    Hypothyroidism    Current Assessment & Plan     TSH 2.282. Continue Levothyroxine 50 mcg           Relevant  Medications    levothyroxine (SYNTHROID) 50 MCG tablet    Thyroid nodule - Primary    Current Assessment & Plan     June 2021 Thyroid US 3 mm complex cystic left thyroid nodule           Relevant Orders    US Thyroid       GI    Gastroesophageal reflux disease    Current Assessment & Plan     GERD diet and precautions discussed such as:  Avoid tobacco/ alcohol, NSAID use; Avoid spicy/ acidic foods, tomato sauce, mary, caffeine, chocolate, onions  Eat smaller meals; keep HOB elevated at least 2 hours after eating  Continue with current medication regimen- he does not need refills at this time, asked to not send, he will call clinic when needed                 Follow up in 6 months with wellness labs.                        This service was not originating from a related E/M service provided within the previous 7 days nor will  to an E/M service or procedure within the next 24 hours or my soonest available appointment.  Prevailing standard of care was able to be met in this audio-only visit.

## 2022-07-27 ENCOUNTER — PATIENT OUTREACH (OUTPATIENT)
Dept: ADMINISTRATIVE | Facility: HOSPITAL | Age: 62
End: 2022-07-27
Payer: MEDICARE

## 2022-07-27 NOTE — PROGRESS NOTES
Population Health. Out Reach.  The following record(s)  below were uploaded for Health Maintenance .    11/14/17 COLONOSCOPY

## 2022-09-14 ENCOUNTER — HOSPITAL ENCOUNTER (OUTPATIENT)
Dept: RADIOLOGY | Facility: HOSPITAL | Age: 62
Discharge: HOME | End: 2022-09-14
Attending: NURSE PRACTITIONER
Payer: MEDICARE

## 2022-09-14 DIAGNOSIS — E04.1 THYROID NODULE: ICD-10-CM

## 2022-09-14 PROCEDURE — 76536 US EXAM OF HEAD AND NECK: CPT | Mod: TC

## 2022-09-15 ENCOUNTER — TELEPHONE (OUTPATIENT)
Dept: INTERNAL MEDICINE | Facility: CLINIC | Age: 62
End: 2022-09-15
Payer: MEDICARE

## 2022-09-15 NOTE — TELEPHONE ENCOUNTER
Please inform patient Thyroid US results with stable findings of nodule to left thyroid gland. No need for further follow up at this time.     Thank you

## 2022-10-02 ENCOUNTER — HOSPITAL ENCOUNTER (EMERGENCY)
Facility: HOSPITAL | Age: 62
Discharge: HOME | End: 2022-10-02
Attending: STUDENT IN AN ORGANIZED HEALTH CARE EDUCATION/TRAINING PROGRAM
Payer: MEDICARE

## 2022-10-02 VITALS
SYSTOLIC BLOOD PRESSURE: 136 MMHG | RESPIRATION RATE: 11 BRPM | HEART RATE: 69 BPM | TEMPERATURE: 98 F | DIASTOLIC BLOOD PRESSURE: 79 MMHG | OXYGEN SATURATION: 98 %

## 2022-10-02 DIAGNOSIS — R52 PAIN: ICD-10-CM

## 2022-10-02 DIAGNOSIS — R53.83 FATIGUE, UNSPECIFIED TYPE: Primary | ICD-10-CM

## 2022-10-02 LAB
ALBUMIN SERPL-MCNC: 4.6 GM/DL (ref 3.4–4.8)
ALBUMIN/GLOB SERPL: 1.9 RATIO (ref 1.1–2)
ALP SERPL-CCNC: 86 UNIT/L (ref 40–150)
ALT SERPL-CCNC: 21 UNIT/L (ref 0–55)
APPEARANCE UR: CLEAR
AST SERPL-CCNC: 14 UNIT/L (ref 5–34)
BACTERIA #/AREA URNS AUTO: NORMAL /HPF
BASOPHILS # BLD AUTO: 0.02 X10(3)/MCL (ref 0–0.2)
BASOPHILS NFR BLD AUTO: 0.3 %
BILIRUB UR QL STRIP.AUTO: NEGATIVE MG/DL
BILIRUBIN DIRECT+TOT PNL SERPL-MCNC: 0.7 MG/DL
BUN SERPL-MCNC: 10.7 MG/DL (ref 8.4–25.7)
CALCIUM SERPL-MCNC: 9.4 MG/DL (ref 8.8–10)
CHLORIDE SERPL-SCNC: 107 MMOL/L (ref 98–107)
CO2 SERPL-SCNC: 23 MMOL/L (ref 23–31)
COLOR UR AUTO: YELLOW
CREAT SERPL-MCNC: 0.8 MG/DL (ref 0.73–1.18)
EOSINOPHIL # BLD AUTO: 0.18 X10(3)/MCL (ref 0–0.9)
EOSINOPHIL NFR BLD AUTO: 2.4 %
ERYTHROCYTE [DISTWIDTH] IN BLOOD BY AUTOMATED COUNT: 11.7 % (ref 11.5–17)
FLUAV AG UPPER RESP QL IA.RAPID: NOT DETECTED
FLUBV AG UPPER RESP QL IA.RAPID: NOT DETECTED
GFR SERPLBLD CREATININE-BSD FMLA CKD-EPI: >60 MLS/MIN/1.73/M2
GLOBULIN SER-MCNC: 2.4 GM/DL (ref 2.4–3.5)
GLUCOSE SERPL-MCNC: 156 MG/DL (ref 82–115)
GLUCOSE UR QL STRIP.AUTO: NEGATIVE MG/DL
HCT VFR BLD AUTO: 42.6 % (ref 42–52)
HGB BLD-MCNC: 14.4 GM/DL (ref 14–18)
IMM GRANULOCYTES # BLD AUTO: 0.01 X10(3)/MCL (ref 0–0.04)
IMM GRANULOCYTES NFR BLD AUTO: 0.1 %
KETONES UR QL STRIP.AUTO: NEGATIVE MG/DL
LEUKOCYTE ESTERASE UR QL STRIP.AUTO: NEGATIVE UNIT/L
LYMPHOCYTES # BLD AUTO: 0.8 X10(3)/MCL (ref 0.6–4.6)
LYMPHOCYTES NFR BLD AUTO: 10.5 %
MCH RBC QN AUTO: 30.5 PG (ref 27–31)
MCHC RBC AUTO-ENTMCNC: 33.8 MG/DL (ref 33–36)
MCV RBC AUTO: 90.3 FL (ref 80–94)
MONOCYTES # BLD AUTO: 0.35 X10(3)/MCL (ref 0.1–1.3)
MONOCYTES NFR BLD AUTO: 4.6 %
NEUTROPHILS # BLD AUTO: 6.2 X10(3)/MCL (ref 2.1–9.2)
NEUTROPHILS NFR BLD AUTO: 82.1 %
NITRITE UR QL STRIP.AUTO: NEGATIVE
PH UR STRIP.AUTO: 7 [PH]
PLATELET # BLD AUTO: 138 X10(3)/MCL (ref 130–400)
PMV BLD AUTO: 11.7 FL (ref 7.4–10.4)
POC CARDIAC TROPONIN I: 0 NG/ML
POTASSIUM SERPL-SCNC: 4 MMOL/L (ref 3.5–5.1)
PROT SERPL-MCNC: 7 GM/DL (ref 5.8–7.6)
PROT UR QL STRIP.AUTO: NEGATIVE MG/DL
RBC # BLD AUTO: 4.72 X10(6)/MCL (ref 4.7–6.1)
RBC #/AREA URNS AUTO: NORMAL /HPF
RBC UR QL AUTO: NEGATIVE UNIT/L
SAMPLE: NORMAL
SARS-COV-2 RNA RESP QL NAA+PROBE: NOT DETECTED
SODIUM SERPL-SCNC: 143 MMOL/L (ref 136–145)
SP GR UR STRIP.AUTO: 1.01
SQUAMOUS #/AREA URNS AUTO: NORMAL /HPF
TSH SERPL-ACNC: 1.95 UIU/ML (ref 0.35–4.94)
UROBILINOGEN UR STRIP-ACNC: 0.2 MG/DL
WBC # SPEC AUTO: 7.6 X10(3)/MCL (ref 4.5–11.5)
WBC #/AREA URNS AUTO: NORMAL /HPF

## 2022-10-02 PROCEDURE — 99285 EMERGENCY DEPT VISIT HI MDM: CPT | Mod: 25

## 2022-10-02 PROCEDURE — 84443 ASSAY THYROID STIM HORMONE: CPT | Performed by: STUDENT IN AN ORGANIZED HEALTH CARE EDUCATION/TRAINING PROGRAM

## 2022-10-02 PROCEDURE — 0241U COVID/FLU A&B PCR: CPT | Performed by: STUDENT IN AN ORGANIZED HEALTH CARE EDUCATION/TRAINING PROGRAM

## 2022-10-02 PROCEDURE — 93010 EKG 12-LEAD: ICD-10-PCS | Mod: ,,, | Performed by: STUDENT IN AN ORGANIZED HEALTH CARE EDUCATION/TRAINING PROGRAM

## 2022-10-02 PROCEDURE — 36415 COLL VENOUS BLD VENIPUNCTURE: CPT | Performed by: STUDENT IN AN ORGANIZED HEALTH CARE EDUCATION/TRAINING PROGRAM

## 2022-10-02 PROCEDURE — 93010 ELECTROCARDIOGRAM REPORT: CPT | Mod: ,,, | Performed by: STUDENT IN AN ORGANIZED HEALTH CARE EDUCATION/TRAINING PROGRAM

## 2022-10-02 PROCEDURE — 81001 URINALYSIS AUTO W/SCOPE: CPT | Performed by: STUDENT IN AN ORGANIZED HEALTH CARE EDUCATION/TRAINING PROGRAM

## 2022-10-02 PROCEDURE — 80053 COMPREHEN METABOLIC PANEL: CPT | Performed by: STUDENT IN AN ORGANIZED HEALTH CARE EDUCATION/TRAINING PROGRAM

## 2022-10-02 PROCEDURE — 93005 ELECTROCARDIOGRAM TRACING: CPT

## 2022-10-02 PROCEDURE — 85025 COMPLETE CBC W/AUTO DIFF WBC: CPT | Performed by: STUDENT IN AN ORGANIZED HEALTH CARE EDUCATION/TRAINING PROGRAM

## 2022-10-02 RX ORDER — ASPIRIN 81 MG/1
81 TABLET ORAL DAILY
COMMUNITY

## 2022-10-02 RX ORDER — ROSUVASTATIN CALCIUM 40 MG/1
40 TABLET, COATED ORAL DAILY
COMMUNITY
Start: 2022-09-22

## 2022-10-02 NOTE — ED PROVIDER NOTES
"Encounter Date: 10/2/2022       History     Chief Complaint   Patient presents with    Fatigue     Headaches/pain in neck /lt shoulder- feeling drained     The patient is a 62-year-old white male past medical history of hypertension, hyperlipidemia, cancer in remission, hyperlipidemia who presented to the ER today with a 3 day history of just generalized fatigue.  Patient states he feels fine in the morning but he seems to become more fatigued throughout the afternoon.  Patient denies any ptosis as a result.  Patient states that he also had a headache 5 days ago which she wanted to make sure was not "stroke. "Patient states his friend who is his same age recently was diagnosed with a stroke and that makes him extremely anxious.  Patient states that the symptom onset coincided with the use of synthetic marijuana.  Patient does not attribute his symptoms being due to synthetic marijuana but he does state that he came about shortly after use.  Patient denies any diplopia, dysarthria, ataxia, dysphagia, focal deficits, changes in sensation, changes in vision, changes in strength.  Patient denies any chest pain, shortness of breath, abdominal pain, diarrhea, constipation, dysuria, hematuria.    Review of patient's allergies indicates:  No Known Allergies  Past Medical History:   Diagnosis Date    Cancer     GERD (gastroesophageal reflux disease)     Hyperlipidemia     Hypertension     Personal history of colonic polyps 11/14/2017     Past Surgical History:   Procedure Laterality Date    ANGIOGRAPHY      COLONOSCOPY W/ BIOPSIES      ESOPHAGOGASTRODUODENOSCOPY      HERNIA REPAIR      LEFT HEART CATHETERIZATION       Family History   Problem Relation Age of Onset    Heart disease Mother     Coronary artery disease Mother     Hypertension Father     Heart disease Sister     Leukemia Sister         32 yo    Cancer Sister     Colon cancer Cousin      Social History     Tobacco Use    Smoking status: Former    Smokeless " tobacco: Never    Tobacco comments:     states quit over 20 years ago   Substance Use Topics    Alcohol use: Never    Drug use: Never     Review of Systems   Constitutional:  Positive for fatigue. Negative for chills and fever.   HENT:  Negative for congestion, sore throat and trouble swallowing.    Eyes:  Negative for pain and visual disturbance.   Respiratory:  Negative for cough, shortness of breath and wheezing.    Cardiovascular:  Negative for chest pain and palpitations.   Gastrointestinal:  Negative for abdominal pain, blood in stool, constipation, diarrhea, nausea and vomiting.   Genitourinary:  Negative for dysuria and hematuria.   Musculoskeletal:  Negative for back pain and myalgias.   Skin:  Negative for rash and wound.   Neurological:  Positive for headaches. Negative for dizziness, tremors, seizures, syncope, facial asymmetry, speech difficulty, weakness, light-headedness and numbness.   Psychiatric/Behavioral:  Negative for confusion. The patient is not nervous/anxious.      Physical Exam     Initial Vitals [10/02/22 1048]   BP Pulse Resp Temp SpO2   (!) 151/89 77 16 98 °F (36.7 °C) 98 %      MAP       --         Physical Exam    Nursing note and vitals reviewed.  Constitutional: He appears well-developed and well-nourished. No distress.   HENT:   Head: Normocephalic and atraumatic.   Eyes: Conjunctivae and EOM are normal. Right eye exhibits no discharge. Left eye exhibits no discharge. No scleral icterus.   Neck: No tracheal deviation present.   Normal range of motion.  Cardiovascular:  Normal rate, regular rhythm, normal heart sounds and intact distal pulses.     Exam reveals no gallop and no friction rub.       No murmur heard.  Pulmonary/Chest: Breath sounds normal. No respiratory distress. He has no wheezes. He has no rhonchi. He has no rales.   Abdominal: Abdomen is soft. He exhibits no distension. There is no abdominal tenderness. There is no guarding.   Musculoskeletal:         General: No  tenderness or edema. Normal range of motion.      Cervical back: Normal range of motion.     Neurological: He is alert and oriented to person, place, and time. He has normal strength. No cranial nerve deficit or sensory deficit. GCS score is 15. GCS eye subscore is 4. GCS verbal subscore is 5. GCS motor subscore is 6.   CN II-XII intact bilaterally, PERRLA, EOMI, AOx3, no facial asymmetry noted, no abnormalities of peripheral vision loss, strength 5/5 x 4 extremities, sensation intact throughout, no focal neurological deficits noted on exam.  Gait stable without assistance.  Negative pronator drift bilaterally.       Skin: Skin is warm and dry. No rash and no abscess noted. No erythema. No pallor.   Psychiatric: His behavior is normal. Judgment normal.       ED Course   Procedures  Labs Reviewed   COMPREHENSIVE METABOLIC PANEL - Abnormal; Notable for the following components:       Result Value    Glucose Level 156 (*)     All other components within normal limits   CBC WITH DIFFERENTIAL - Abnormal; Notable for the following components:    MPV 11.7 (*)     All other components within normal limits   COVID/FLU A&B PCR - Normal   TSH - Normal   URINALYSIS, MICROSCOPIC - Normal   CBC W/ AUTO DIFFERENTIAL    Narrative:     The following orders were created for panel order CBC Auto Differential.  Procedure                               Abnormality         Status                     ---------                               -----------         ------                     CBC with Differential[[id number]]        Abnormal            Final result                 Please view results for these tests on the individual orders.   URINALYSIS, REFLEX TO URINE CULTURE   TROPONIN ISTAT   POCT TROPONIN     EKG Readings: (Independently Interpreted)   Initial Reading: No STEMI. Rhythm: Normal Sinus Rhythm. Heart Rate: 72. Ectopy: No Ectopy. Conduction: Normal. ST Segments: Normal ST Segments. T Waves: Normal. Axis: Normal.     Imaging  Results              CT Head Without Contrast (Final result)  Result time 10/02/22 11:48:02      Final result by Diego Colón MD (10/02/22 11:48:02)                   Impression:      No acute intracranial abnormality identified.  Findings of microvascular ischemic disease are grossly unchanged.      Electronically signed by: Diego Colón  Date:    10/02/2022  Time:    11:48               Narrative:    EXAMINATION:  CT HEAD WITHOUT CONTRAST    CLINICAL HISTORY:  Headache, new or worsening (Age >= 50y);    TECHNIQUE:  Low dose axial images were obtained through the head.  Coronal and sagittal reformations were also performed. Contrast was not administered.    Automatic exposure control was utilized to reduce the patient's radiation dose.    DLP= 1243    COMPARISON:  02/11/2017    FINDINGS:  No acute intracranial hemorrhage, edema or mass. No acute parenchymal abnormality.    Mild cerebral atrophy with concordant ventricular enlargement.    There is normal gray white differentiation.    The osseous structures are normal.    The mastoid air cells are clear.    The auditory canals are patent bilaterally.    The globes and orbital contents are normal bilaterally.    The visualized maxillary, ethmoid and sphenoid sinuses are clear.                                       Medications - No data to display  Medical Decision Making:   Initial Assessment:   Overall well-appearing 8-year-old male  Clinical Tests:   Lab Tests: Ordered and Reviewed  Radiological Study: Ordered and Reviewed  ED Management:  Vital signs stable patient is afebrile  Symptoms not consistent with any particular etiology  However given the symptoms onset shortly after synthetic marijuana use seems to be a possible causative agent   Basic labs largely unremarkable   TSH within normal limits   CT head unremarkable   Troponin and within normal limits an EKG is largely noncontributory   I discussed with patient to avoid sent today use marijuana in the  future and to monitor symptoms  I did advise him to return if symptoms 3 persist or worsen  Patient can also have some vitamin-B levels and vitamin-D levels drawn by his PCP for further workup   All questions answered in layman's terms                        Clinical Impression:   Final diagnoses:  [R52] Pain  [R53.83] Fatigue, unspecified type  [F19.10] Synthetic cannabinoid abuse (Primary)        ED Disposition Condition    Discharge Stable          ED Prescriptions    None       Follow-up Information       Follow up With Specialties Details Why Contact Info    [facility] - Emergency Dept Emergency Medicine  If symptoms worsen [address] [facility] [id number] [phone]    Lea Hunter NP Nurse Practitioner Schedule an appointment as soon as possible for a visit   [address] [id number] [phone]               Mateo Norton MD  10/02/22 [id number]

## 2022-10-03 ENCOUNTER — TELEPHONE (OUTPATIENT)
Dept: INTERNAL MEDICINE | Facility: CLINIC | Age: 62
End: 2022-10-03
Payer: MEDICARE

## 2022-10-03 NOTE — TELEPHONE ENCOUNTER
Patient can be scheduled for visit to discuss in next available, clinic or virtual visit okay.     Thanks

## 2022-10-03 NOTE — TELEPHONE ENCOUNTER
Pt girlfriend called stating pt was seen in ER yesterday at [facility] and they did not find anything wrong with pt they ran test to rule out heart attack and did a brain scan but pt is still staying tired pt is not wanting to do anything come towards the afternoon ER doctor told them to maybe do further lab work with PCP. Pt did ask to make an appointment I did transfer them to [place].

## 2022-10-04 ENCOUNTER — OFFICE VISIT (OUTPATIENT)
Dept: INTERNAL MEDICINE | Facility: CLINIC | Age: 62
End: 2022-10-04
Payer: MEDICARE

## 2022-10-04 VITALS
HEART RATE: 71 BPM | BODY MASS INDEX: 26.68 KG/M2 | WEIGHT: 186.38 LBS | RESPIRATION RATE: 18 BRPM | SYSTOLIC BLOOD PRESSURE: 140 MMHG | DIASTOLIC BLOOD PRESSURE: 84 MMHG | HEIGHT: 70 IN | TEMPERATURE: 98 F

## 2022-10-04 DIAGNOSIS — R09.82 POSTNASAL DRIP: ICD-10-CM

## 2022-10-04 DIAGNOSIS — R53.83 FATIGUE, UNSPECIFIED TYPE: Primary | ICD-10-CM

## 2022-10-04 PROCEDURE — 3008F BODY MASS INDEX DOCD: CPT | Mod: CPTII,,, | Performed by: NURSE PRACTITIONER

## 2022-10-04 PROCEDURE — 3008F PR BODY MASS INDEX (BMI) DOCUMENTED: ICD-10-PCS | Mod: CPTII,,, | Performed by: NURSE PRACTITIONER

## 2022-10-04 PROCEDURE — 3077F SYST BP >= 140 MM HG: CPT | Mod: CPTII,,, | Performed by: NURSE PRACTITIONER

## 2022-10-04 PROCEDURE — 3077F PR MOST RECENT SYSTOLIC BLOOD PRESSURE >= 140 MM HG: ICD-10-PCS | Mod: CPTII,,, | Performed by: NURSE PRACTITIONER

## 2022-10-04 PROCEDURE — 1159F MED LIST DOCD IN RCRD: CPT | Mod: CPTII,,, | Performed by: NURSE PRACTITIONER

## 2022-10-04 PROCEDURE — 99214 OFFICE O/P EST MOD 30 MIN: CPT | Mod: S$PBB,,, | Performed by: NURSE PRACTITIONER

## 2022-10-04 PROCEDURE — 1159F PR MEDICATION LIST DOCUMENTED IN MEDICAL RECORD: ICD-10-PCS | Mod: CPTII,,, | Performed by: NURSE PRACTITIONER

## 2022-10-04 PROCEDURE — 3079F DIAST BP 80-89 MM HG: CPT | Mod: CPTII,,, | Performed by: NURSE PRACTITIONER

## 2022-10-04 PROCEDURE — 3079F PR MOST RECENT DIASTOLIC BLOOD PRESSURE 80-89 MM HG: ICD-10-PCS | Mod: CPTII,,, | Performed by: NURSE PRACTITIONER

## 2022-10-04 PROCEDURE — 1160F RVW MEDS BY RX/DR IN RCRD: CPT | Mod: CPTII,,, | Performed by: NURSE PRACTITIONER

## 2022-10-04 PROCEDURE — 99215 OFFICE O/P EST HI 40 MIN: CPT | Mod: PBBFAC | Performed by: NURSE PRACTITIONER

## 2022-10-04 PROCEDURE — 1160F PR REVIEW ALL MEDS BY PRESCRIBER/CLIN PHARMACIST DOCUMENTED: ICD-10-PCS | Mod: CPTII,,, | Performed by: NURSE PRACTITIONER

## 2022-10-04 PROCEDURE — 99214 PR OFFICE/OUTPT VISIT, EST, LEVL IV, 30-39 MIN: ICD-10-PCS | Mod: S$PBB,,, | Performed by: NURSE PRACTITIONER

## 2022-10-04 RX ORDER — ATORVASTATIN CALCIUM 40 MG/1
TABLET, FILM COATED ORAL
COMMUNITY
Start: 2022-09-20 | End: 2022-12-20

## 2022-10-04 RX ORDER — DOXAZOSIN 2 MG/1
2 TABLET ORAL DAILY
Status: ON HOLD | COMMUNITY
Start: 2022-09-30 | End: 2023-06-01 | Stop reason: HOSPADM
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
## 2022-11-04 ENCOUNTER — LAB VISIT (OUTPATIENT)
Dept: LAB | Facility: HOSPITAL | Age: 62
End: 2022-11-04
Attending: INTERNAL MEDICINE
Payer: MEDICARE

## 2022-11-04 DIAGNOSIS — I10 HYPERTENSION, UNSPECIFIED TYPE: Primary | ICD-10-CM

## 2022-11-04 DIAGNOSIS — E78.2 MIXED HYPERLIPIDEMIA: ICD-10-CM

## 2022-11-04 LAB
ALBUMIN SERPL-MCNC: 4.6 GM/DL (ref 3.4–4.8)
ALBUMIN/GLOB SERPL: 1.8 RATIO (ref 1.1–2)
ALP SERPL-CCNC: 84 UNIT/L (ref 40–150)
ALT SERPL-CCNC: 24 UNIT/L (ref 0–55)
AST SERPL-CCNC: 19 UNIT/L (ref 5–34)
BILIRUBIN DIRECT+TOT PNL SERPL-MCNC: 0.6 MG/DL
BUN SERPL-MCNC: 9.1 MG/DL (ref 8.4–25.7)
CALCIUM SERPL-MCNC: 9.4 MG/DL (ref 8.8–10)
CHLORIDE SERPL-SCNC: 106 MMOL/L (ref 98–107)
CHOLEST SERPL-MCNC: 117 MG/DL
CHOLEST/HDLC SERPL: 2 {RATIO} (ref 0–5)
CO2 SERPL-SCNC: 25 MMOL/L (ref 23–31)
CREAT SERPL-MCNC: 0.84 MG/DL (ref 0.73–1.18)
GFR SERPLBLD CREATININE-BSD FMLA CKD-EPI: >60 MLS/MIN/1.73/M2
GLOBULIN SER-MCNC: 2.5 GM/DL (ref 2.4–3.5)
GLUCOSE SERPL-MCNC: 117 MG/DL (ref 82–115)
HDLC SERPL-MCNC: 48 MG/DL (ref 35–60)
LDLC SERPL CALC-MCNC: 52 MG/DL (ref 50–140)
POTASSIUM SERPL-SCNC: 4.1 MMOL/L (ref 3.5–5.1)
PROT SERPL-MCNC: 7.1 GM/DL (ref 5.8–7.6)
SODIUM SERPL-SCNC: 142 MMOL/L (ref 136–145)
TRIGL SERPL-MCNC: 84 MG/DL (ref 34–140)
VLDLC SERPL CALC-MCNC: 17 MG/DL

## 2022-11-04 PROCEDURE — 36415 COLL VENOUS BLD VENIPUNCTURE: CPT

## 2022-11-04 PROCEDURE — 80053 COMPREHEN METABOLIC PANEL: CPT

## 2022-11-04 PROCEDURE — 80061 LIPID PANEL: CPT

## 2022-12-16 ENCOUNTER — LAB VISIT (OUTPATIENT)
Dept: LAB | Facility: HOSPITAL | Age: 62
End: 2022-12-16
Attending: NURSE PRACTITIONER
Payer: MEDICARE

## 2022-12-16 DIAGNOSIS — I10 HYPERTENSION, UNSPECIFIED TYPE: ICD-10-CM

## 2022-12-16 DIAGNOSIS — K21.9 GASTROESOPHAGEAL REFLUX DISEASE, UNSPECIFIED WHETHER ESOPHAGITIS PRESENT: ICD-10-CM

## 2022-12-16 DIAGNOSIS — I65.23 BILATERAL CAROTID ARTERY STENOSIS: ICD-10-CM

## 2022-12-16 DIAGNOSIS — E03.9 HYPOTHYROIDISM, UNSPECIFIED TYPE: ICD-10-CM

## 2022-12-16 DIAGNOSIS — Z00.00 WELL ADULT EXAM: ICD-10-CM

## 2022-12-16 DIAGNOSIS — E04.1 THYROID NODULE: ICD-10-CM

## 2022-12-16 LAB
ALBUMIN SERPL-MCNC: 5 G/DL (ref 3.4–4.8)
ALBUMIN/GLOB SERPL: 1.9 RATIO (ref 1.1–2)
ALP SERPL-CCNC: 77 UNIT/L (ref 40–150)
ALT SERPL-CCNC: 18 UNIT/L (ref 0–55)
AST SERPL-CCNC: 15 UNIT/L (ref 5–34)
BASOPHILS # BLD AUTO: 0.02 X10(3)/MCL (ref 0–0.2)
BASOPHILS NFR BLD AUTO: 0.3 %
BILIRUBIN DIRECT+TOT PNL SERPL-MCNC: 0.7 MG/DL
BUN SERPL-MCNC: 9.4 MG/DL (ref 8.4–25.7)
CALCIUM SERPL-MCNC: 9.8 MG/DL (ref 8.8–10)
CHLORIDE SERPL-SCNC: 104 MMOL/L (ref 98–107)
CHOLEST SERPL-MCNC: 117 MG/DL
CHOLEST/HDLC SERPL: 2 {RATIO} (ref 0–5)
CO2 SERPL-SCNC: 28 MMOL/L (ref 23–31)
CREAT SERPL-MCNC: 0.81 MG/DL (ref 0.73–1.18)
EOSINOPHIL # BLD AUTO: 0.37 X10(3)/MCL (ref 0–0.9)
EOSINOPHIL NFR BLD AUTO: 5 %
ERYTHROCYTE [DISTWIDTH] IN BLOOD BY AUTOMATED COUNT: 11.5 % (ref 11.6–14.4)
GFR SERPLBLD CREATININE-BSD FMLA CKD-EPI: >60 MLS/MIN/1.73/M2
GLOBULIN SER-MCNC: 2.6 GM/DL (ref 2.4–3.5)
GLUCOSE SERPL-MCNC: 109 MG/DL (ref 82–115)
HCT VFR BLD AUTO: 42.1 % (ref 42–52)
HDLC SERPL-MCNC: 52 MG/DL (ref 35–60)
HGB BLD-MCNC: 14.4 GM/DL (ref 14–18)
IMM GRANULOCYTES # BLD AUTO: 0.01 X10(3)/MCL (ref 0–0.04)
IMM GRANULOCYTES NFR BLD AUTO: 0.1 %
LDLC SERPL CALC-MCNC: 49 MG/DL (ref 50–140)
LYMPHOCYTES # BLD AUTO: 1.07 X10(3)/MCL (ref 0.6–4.6)
LYMPHOCYTES NFR BLD AUTO: 14.6 %
MCH RBC QN AUTO: 31 PG
MCHC RBC AUTO-ENTMCNC: 34.2 MG/DL (ref 33–36)
MCV RBC AUTO: 90.7 FL (ref 80–94)
MONOCYTES # BLD AUTO: 0.5 X10(3)/MCL (ref 0.1–1.3)
MONOCYTES NFR BLD AUTO: 6.8 %
NEUTROPHILS # BLD AUTO: 5.38 X10(3)/MCL (ref 2.1–9.2)
NEUTROPHILS NFR BLD AUTO: 73.2 %
PLATELET # BLD AUTO: 137 X10(3)/MCL (ref 140–371)
PMV BLD AUTO: 11.5 FL (ref 9.4–12.4)
POTASSIUM SERPL-SCNC: 4.2 MMOL/L (ref 3.5–5.1)
PROT SERPL-MCNC: 7.6 GM/DL (ref 5.8–7.6)
RBC # BLD AUTO: 4.64 X10(6)/MCL (ref 4.7–6.1)
SODIUM SERPL-SCNC: 142 MMOL/L (ref 136–145)
TRIGL SERPL-MCNC: 79 MG/DL (ref 34–140)
TSH SERPL-ACNC: 2.62 UIU/ML (ref 0.35–4.94)
VLDLC SERPL CALC-MCNC: 16 MG/DL
WBC # SPEC AUTO: 7.4 X10(3)/MCL (ref 4.5–11.5)

## 2022-12-16 PROCEDURE — 84443 ASSAY THYROID STIM HORMONE: CPT

## 2022-12-16 PROCEDURE — 80053 COMPREHEN METABOLIC PANEL: CPT

## 2022-12-16 PROCEDURE — 80061 LIPID PANEL: CPT

## 2022-12-16 PROCEDURE — 36415 COLL VENOUS BLD VENIPUNCTURE: CPT

## 2022-12-16 PROCEDURE — 85025 COMPLETE CBC W/AUTO DIFF WBC: CPT

## 2022-12-20 ENCOUNTER — OFFICE VISIT (OUTPATIENT)
Dept: INTERNAL MEDICINE | Facility: CLINIC | Age: 62
End: 2022-12-20
Payer: MEDICARE

## 2022-12-20 ENCOUNTER — TELEPHONE (OUTPATIENT)
Dept: INTERNAL MEDICINE | Facility: CLINIC | Age: 62
End: 2022-12-20

## 2022-12-20 DIAGNOSIS — N13.8 BENIGN PROSTATIC HYPERPLASIA WITH URINARY OBSTRUCTION: ICD-10-CM

## 2022-12-20 DIAGNOSIS — I65.23 BILATERAL CAROTID ARTERY STENOSIS: ICD-10-CM

## 2022-12-20 DIAGNOSIS — G89.29 CHRONIC BILATERAL LOW BACK PAIN WITHOUT SCIATICA: Primary | ICD-10-CM

## 2022-12-20 DIAGNOSIS — M54.50 CHRONIC BILATERAL LOW BACK PAIN WITHOUT SCIATICA: Primary | ICD-10-CM

## 2022-12-20 DIAGNOSIS — N40.1 BENIGN PROSTATIC HYPERPLASIA WITH URINARY OBSTRUCTION: ICD-10-CM

## 2022-12-20 DIAGNOSIS — K21.9 GASTROESOPHAGEAL REFLUX DISEASE, UNSPECIFIED WHETHER ESOPHAGITIS PRESENT: ICD-10-CM

## 2022-12-20 DIAGNOSIS — I10 HYPERTENSION, UNSPECIFIED TYPE: ICD-10-CM

## 2022-12-20 DIAGNOSIS — E03.9 HYPOTHYROIDISM, UNSPECIFIED TYPE: ICD-10-CM

## 2022-12-20 DIAGNOSIS — E78.2 MIXED HYPERLIPIDEMIA: ICD-10-CM

## 2022-12-20 DIAGNOSIS — J30.9 ALLERGIC RHINITIS, UNSPECIFIED SEASONALITY, UNSPECIFIED TRIGGER: ICD-10-CM

## 2022-12-20 PROBLEM — E66.9 OBESITY: Status: RESOLVED | Noted: 2022-07-21 | Resolved: 2022-12-20

## 2022-12-20 PROCEDURE — 99442 PR PHYSICIAN TELEPHONE EVALUATION 11-20 MIN: ICD-10-PCS | Mod: 95,,, | Performed by: NURSE PRACTITIONER

## 2022-12-20 PROCEDURE — 1160F PR REVIEW ALL MEDS BY PRESCRIBER/CLIN PHARMACIST DOCUMENTED: ICD-10-PCS | Mod: CPTII,95,, | Performed by: NURSE PRACTITIONER

## 2022-12-20 PROCEDURE — 1159F MED LIST DOCD IN RCRD: CPT | Mod: CPTII,95,, | Performed by: NURSE PRACTITIONER

## 2022-12-20 PROCEDURE — 1159F PR MEDICATION LIST DOCUMENTED IN MEDICAL RECORD: ICD-10-PCS | Mod: CPTII,95,, | Performed by: NURSE PRACTITIONER

## 2022-12-20 PROCEDURE — 1160F RVW MEDS BY RX/DR IN RCRD: CPT | Mod: CPTII,95,, | Performed by: NURSE PRACTITIONER

## 2022-12-20 PROCEDURE — 99442 PR PHYSICIAN TELEPHONE EVALUATION 11-20 MIN: CPT | Mod: 95,,, | Performed by: NURSE PRACTITIONER

## 2022-12-20 RX ORDER — CETIRIZINE HYDROCHLORIDE 10 MG/1
10 TABLET ORAL NIGHTLY
Qty: 90 TABLET | Refills: 3 | Status: ON HOLD | OUTPATIENT
Start: 2022-12-20 | End: 2023-06-01 | Stop reason: HOSPADM

## 2022-12-20 RX ORDER — LEVOTHYROXINE SODIUM 50 UG/1
50 TABLET ORAL
Qty: 90 TABLET | Refills: 2 | Status: SHIPPED | OUTPATIENT
Start: 2022-12-20 | End: 2023-06-20 | Stop reason: SDUPTHER

## 2022-12-20 RX ORDER — AZELASTINE 1 MG/ML
1 SPRAY, METERED NASAL 2 TIMES DAILY
Qty: 30 ML | Refills: 3 | Status: SHIPPED | OUTPATIENT
Start: 2022-12-20

## 2022-12-20 RX ORDER — ESOMEPRAZOLE MAGNESIUM 40 MG/1
40 GRANULE, DELAYED RELEASE ORAL
Qty: 90 EACH | Refills: 3 | Status: SHIPPED | OUTPATIENT
Start: 2022-12-20

## 2022-12-20 RX ORDER — FLUTICASONE PROPIONATE 50 MCG
1 SPRAY, SUSPENSION (ML) NASAL 2 TIMES DAILY
Qty: 16 G | Refills: 6 | Status: SHIPPED | OUTPATIENT
Start: 2022-12-20

## 2022-12-20 RX ORDER — ACETAMINOPHEN 160 MG/5ML
1000 LIQUID ORAL EVERY 6 HOURS PRN
Qty: 3750 ML | Refills: 3 | Status: SHIPPED | OUTPATIENT
Start: 2022-12-20 | End: 2023-06-20 | Stop reason: SDUPTHER

## 2022-12-20 NOTE — ASSESSMENT & PLAN NOTE
Follow low sodium diet, < 2 gm/day (avoid high salty foods such as processed meats/ sausage/ortiz/ sandwich meat, chips, pickles, cheese, crackers and soft drinks/ electrolyte replacement drinks).  Avoid tobacco/ alcohol use  Educated on health benefits of at least 5 days/ week of 30 minutes moderate intensity exercise (brisk walking) and 2 or more days/ week of muscle strength activities  Daily ASA 81 mg for CV prevention  Continue current medication regimen
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
## 2022-12-20 NOTE — TELEPHONE ENCOUNTER
Pt called stated he had labs done and was requesting a call back regarding results. Labs were done 12/16/2022    Please advise

## 2022-12-20 NOTE — ASSESSMENT & PLAN NOTE
LDL 49, Trig 79, HLD 52, Total 117  Avoid tobacco/ alcohol  Follow low fat/low cholesterol diet such as avoid/ decrease ortiz, sausage, fried foods, cookies, cakes, chips, cheese, whole milk, butter, mayonnaise. Add olive oil, avocados, lean meats, fresh fruits/ vegetables, heart healthy nuts to diet.  Educated on health benefits of exercise 5 days/ week of at least 30 minutes moderate intensity exercise (brisk walking) and 2 days/ week of muscle strength activities  Daily ASA 81 mg for CV prevention  Continue current medication regimen
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
## 2022-12-20 NOTE — ASSESSMENT & PLAN NOTE
He reports recent visit and US with cardiologist with stable findings. Encouraged to keep f/u with Cardiologist

## 2022-12-20 NOTE — ASSESSMENT & PLAN NOTE
Established with Urologist- Dr. Reis  Continue medications and keep f/u with regular PSA screenings

## 2022-12-20 NOTE — ASSESSMENT & PLAN NOTE
GERD diet and precautions discussed such as:  Avoid tobacco/ alcohol, NSAID use; Avoid spicy/ acidic foods, tomato sauce, mary, caffeine, chocolate, onions  Eat smaller meals; keep HOB elevated at least 2 hours after eating  Continue with current medication regimen

## 2022-12-28 ENCOUNTER — TELEPHONE (OUTPATIENT)
Dept: INTERNAL MEDICINE | Facility: CLINIC | Age: 62
End: 2022-12-28
Payer: MEDICARE

## 2022-12-28 NOTE — TELEPHONE ENCOUNTER
Patient called wants to be referred to pain management for his back. Stated he knows PT was ordered but that will not stop the pain. Please advise.

## 2022-12-28 NOTE — TELEPHONE ENCOUNTER
I need XR results prior to proceeding with any further orders. In regards to pain management provider, he can contact his insurance company for accepting pain management provider. Once he confirms provider will accept him as a new patient with his insurance, he can contact us with name of provider for referral to be placed.     Thanks

## 2023-05-05 ENCOUNTER — LAB VISIT (OUTPATIENT)
Dept: LAB | Facility: HOSPITAL | Age: 63
End: 2023-05-05
Attending: INTERNAL MEDICINE
Payer: MEDICARE

## 2023-05-05 DIAGNOSIS — E78.2 MIXED HYPERLIPIDEMIA: ICD-10-CM

## 2023-05-05 DIAGNOSIS — I10 ESSENTIAL HYPERTENSION, MALIGNANT: Primary | ICD-10-CM

## 2023-05-05 LAB
ALBUMIN SERPL-MCNC: 4.5 G/DL (ref 3.4–4.8)
ALBUMIN/GLOB SERPL: 1.9 RATIO (ref 1.1–2)
ALP SERPL-CCNC: 93 UNIT/L (ref 40–150)
ALT SERPL-CCNC: 24 UNIT/L (ref 0–55)
AST SERPL-CCNC: 18 UNIT/L (ref 5–34)
BILIRUBIN DIRECT+TOT PNL SERPL-MCNC: 0.5 MG/DL
BUN SERPL-MCNC: 9 MG/DL (ref 8.4–25.7)
CALCIUM SERPL-MCNC: 9.4 MG/DL (ref 8.8–10)
CHLORIDE SERPL-SCNC: 106 MMOL/L (ref 98–107)
CHOLEST SERPL-MCNC: 124 MG/DL
CHOLEST/HDLC SERPL: 4 {RATIO} (ref 0–5)
CO2 SERPL-SCNC: 27 MMOL/L (ref 23–31)
CREAT SERPL-MCNC: 0.87 MG/DL (ref 0.73–1.18)
GFR SERPLBLD CREATININE-BSD FMLA CKD-EPI: >60 MLS/MIN/1.73/M2
GLOBULIN SER-MCNC: 2.4 GM/DL (ref 2.4–3.5)
GLUCOSE SERPL-MCNC: 121 MG/DL (ref 82–115)
HDLC SERPL-MCNC: 35 MG/DL (ref 35–60)
LDLC SERPL CALC-MCNC: 54 MG/DL (ref 50–140)
POTASSIUM SERPL-SCNC: 4.3 MMOL/L (ref 3.5–5.1)
PROT SERPL-MCNC: 6.9 GM/DL (ref 5.8–7.6)
SODIUM SERPL-SCNC: 142 MMOL/L (ref 136–145)
TRIGL SERPL-MCNC: 176 MG/DL (ref 34–140)
VLDLC SERPL CALC-MCNC: 35 MG/DL

## 2023-05-05 PROCEDURE — 36415 COLL VENOUS BLD VENIPUNCTURE: CPT

## 2023-05-05 PROCEDURE — 80053 COMPREHEN METABOLIC PANEL: CPT

## 2023-05-05 PROCEDURE — 80061 LIPID PANEL: CPT

## 2023-05-18 DIAGNOSIS — I65.29 CAROTID ARTERY STENOSIS: Primary | ICD-10-CM

## 2023-05-25 ENCOUNTER — ANESTHESIA EVENT (OUTPATIENT)
Dept: SURGERY | Facility: HOSPITAL | Age: 63
DRG: 039 | End: 2023-05-25
Payer: MEDICARE

## 2023-05-25 ENCOUNTER — OFFICE VISIT (OUTPATIENT)
Dept: CARDIAC SURGERY | Facility: CLINIC | Age: 63
End: 2023-05-25
Payer: MEDICARE

## 2023-05-25 VITALS
BODY MASS INDEX: 26.63 KG/M2 | HEART RATE: 65 BPM | SYSTOLIC BLOOD PRESSURE: 141 MMHG | HEIGHT: 70 IN | DIASTOLIC BLOOD PRESSURE: 84 MMHG | WEIGHT: 186 LBS

## 2023-05-25 DIAGNOSIS — I65.22 LEFT CAROTID STENOSIS: Primary | ICD-10-CM

## 2023-05-25 DIAGNOSIS — I65.29 CAROTID ARTERY STENOSIS: ICD-10-CM

## 2023-05-25 PROCEDURE — 3079F DIAST BP 80-89 MM HG: CPT | Mod: CPTII,,, | Performed by: THORACIC SURGERY (CARDIOTHORACIC VASCULAR SURGERY)

## 2023-05-25 PROCEDURE — 3079F PR MOST RECENT DIASTOLIC BLOOD PRESSURE 80-89 MM HG: ICD-10-PCS | Mod: CPTII,,, | Performed by: THORACIC SURGERY (CARDIOTHORACIC VASCULAR SURGERY)

## 2023-05-25 PROCEDURE — 3008F BODY MASS INDEX DOCD: CPT | Mod: CPTII,,, | Performed by: THORACIC SURGERY (CARDIOTHORACIC VASCULAR SURGERY)

## 2023-05-25 PROCEDURE — 3008F PR BODY MASS INDEX (BMI) DOCUMENTED: ICD-10-PCS | Mod: CPTII,,, | Performed by: THORACIC SURGERY (CARDIOTHORACIC VASCULAR SURGERY)

## 2023-05-25 PROCEDURE — 99203 OFFICE O/P NEW LOW 30 MIN: CPT | Mod: ,,, | Performed by: THORACIC SURGERY (CARDIOTHORACIC VASCULAR SURGERY)

## 2023-05-25 PROCEDURE — 99203 PR OFFICE/OUTPT VISIT, NEW, LEVL III, 30-44 MIN: ICD-10-PCS | Mod: ,,, | Performed by: THORACIC SURGERY (CARDIOTHORACIC VASCULAR SURGERY)

## 2023-05-25 PROCEDURE — 1159F MED LIST DOCD IN RCRD: CPT | Mod: CPTII,,, | Performed by: THORACIC SURGERY (CARDIOTHORACIC VASCULAR SURGERY)

## 2023-05-25 PROCEDURE — 3077F PR MOST RECENT SYSTOLIC BLOOD PRESSURE >= 140 MM HG: ICD-10-PCS | Mod: CPTII,,, | Performed by: THORACIC SURGERY (CARDIOTHORACIC VASCULAR SURGERY)

## 2023-05-25 PROCEDURE — 3077F SYST BP >= 140 MM HG: CPT | Mod: CPTII,,, | Performed by: THORACIC SURGERY (CARDIOTHORACIC VASCULAR SURGERY)

## 2023-05-25 PROCEDURE — 1159F PR MEDICATION LIST DOCUMENTED IN MEDICAL RECORD: ICD-10-PCS | Mod: CPTII,,, | Performed by: THORACIC SURGERY (CARDIOTHORACIC VASCULAR SURGERY)

## 2023-05-25 NOTE — H&P (VIEW-ONLY)
History and Physical      Subjective:      Patient ID: Dawson Gillette is a 62 y.o. male.    Chief Complaint: Pre-op Exam (Dr. 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  He was found to have bilateral high-grade carotid stenoses, left greater than right, on screening ultrasound.  He is asymptomatic.  CT scan shows a 80%-90% stenosis on the left and approximately 60% stenosis on the right.  The patient is now admitted for left carotid endarterectomy  The procedure, risks, complications have been discussed in detail and plain language.  The patient voiced understanding and is willing to proceed.      Current Outpatient Medications:     acetaminophen (TYLENOL) 160 mg/5 mL (5 mL) Soln, Take 31.25 mLs (1,000 mg total) by mouth every 6 (six) hours as needed (pain)., Disp: 3750 mL, Rfl: 3    amLODIPine (NORVASC) 10 MG tablet, Take 1 tablet (10 mg total) by mouth once daily., Disp: 90 tablet, Rfl: 3    aspirin (ECOTRIN) 81 MG EC tablet, Take 81 mg by mouth once daily., Disp: , Rfl:     azelastine (ASTELIN) 137 mcg (0.1 %) nasal spray, 1 spray (137 mcg total) by Nasal route 2 (two) times daily., Disp: 30 mL, Rfl: 3    esomeprazole (NEXIUM) 40 mg GrPS, Take 40 mg by mouth before breakfast., Disp: 90 each, Rfl: 3    ibuprofen (ADVIL,MOTRIN) 100 mg/5 mL suspension, Take 30 mLs (600 mg total) by mouth every 8 (eight) hours as needed for Pain., Disp: 900 mL, Rfl: 11    levothyroxine (SYNTHROID) 50 MCG tablet, Take 1 tablet (50 mcg total) by mouth before breakfast., Disp: 90 tablet, Rfl: 2    rosuvastatin (CRESTOR) 40 MG Tab, Take 40 mg by mouth once daily., Disp: , Rfl:     sulfamethoxazole-trimethoprim 200-40 mg/5 ml (BACTRIM,SEPTRA) 200-40 mg/5 mL Susp, Take 5 mLs by mouth once daily., Disp: , Rfl:     cetirizine (ZYRTEC) 10 MG tablet, Take 1 tablet (10 mg total) by mouth every evening. (Patient not taking:  "Reported on 5/25/2023), Disp: 90 tablet, Rfl: 3    doxazosin (CARDURA) 2 MG tablet, Take 2 mg by mouth once daily., Disp: , Rfl:     fluticasone propionate (FLONASE) 50 mcg/actuation nasal spray, 1 spray (50 mcg total) by Each Nostril route 2 (two) times a day. (Patient not taking: Reported on 5/25/2023), Disp: 16 g, Rfl: 6    RECTICARE 5 % Crea, SMARTSIG:Sparingly T-DERMAL Every 3-4 Hours PRN, Disp: , Rfl:   Review of patient's allergies indicates:  No Known Allergies    BP (!) 141/84   Pulse 65   Ht 5' 10" (1.778 m)   Wt 84.4 kg (186 lb)   BMI 26.69 kg/m²     Review of Systems   Constitutional: Negative.   HENT: Negative.    Eyes: Negative.    Cardiovascular: Negative.    Respiratory: Negative.    Endocrine: Negative.    Hematologic/Lymphatic: Negative.    Skin: Negative.    Musculoskeletal: Negative.    Gastrointestinal: Negative.    Genitourinary: Negative.    Neurological: Negative.    Psychiatric/Behavioral: Negative.    Allergic/Immunologic: Negative.        Objective:     Constitutional:  No acute distress  HENT:  Supple without mass.  Tissues are soft and mobile.  There are bilateral soft carotid bruits  Eyes:  PERRLA  Cardiovascular:  Regular rate and rhythm without murmur rub or gallop  Respiratory:  Clear to auscultation  Endocrine:  Hematologic/Lymphatic:   Skin:  Warm and dry.  Skin of the neck is supple and mobile  Musculoskeletal:  No gross deformity  Gastrointestinal:   Genitourinary:   Neurological:  Normal  Psychiatric/Behavioral:   Extremities:  No cyanosis clubbing or edema    Assessment:  Bilateral carotid stenosis.  Left worse than right.  Patient understands that if we find serious sequelae of radiation treatment that we may have to back away from the procedure.     Imaging:  CT angiogram reviewed      Plan:  Left carotid endarterectomy on Wednesday May 31.         History and Physical      Subjective:      Patient ID: Dawson Gillette is a 62 y.o. male.    Chief Complaint: Pre-op Exam " "(Dr. Hartman Carotid Stenosis)      HPI:       Current Outpatient Medications:     acetaminophen (TYLENOL) 160 mg/5 mL (5 mL) Soln, Take 31.25 mLs (1,000 mg total) by mouth every 6 (six) hours as needed (pain)., Disp: 3750 mL, Rfl: 3    amLODIPine (NORVASC) 10 MG tablet, Take 1 tablet (10 mg total) by mouth once daily., Disp: 90 tablet, Rfl: 3    aspirin (ECOTRIN) 81 MG EC tablet, Take 81 mg by mouth once daily., Disp: , Rfl:     azelastine (ASTELIN) 137 mcg (0.1 %) nasal spray, 1 spray (137 mcg total) by Nasal route 2 (two) times daily., Disp: 30 mL, Rfl: 3    esomeprazole (NEXIUM) 40 mg GrPS, Take 40 mg by mouth before breakfast., Disp: 90 each, Rfl: 3    ibuprofen (ADVIL,MOTRIN) 100 mg/5 mL suspension, Take 30 mLs (600 mg total) by mouth every 8 (eight) hours as needed for Pain., Disp: 900 mL, Rfl: 11    levothyroxine (SYNTHROID) 50 MCG tablet, Take 1 tablet (50 mcg total) by mouth before breakfast., Disp: 90 tablet, Rfl: 2    rosuvastatin (CRESTOR) 40 MG Tab, Take 40 mg by mouth once daily., Disp: , Rfl:     sulfamethoxazole-trimethoprim 200-40 mg/5 ml (BACTRIM,SEPTRA) 200-40 mg/5 mL Susp, Take 5 mLs by mouth once daily., Disp: , Rfl:     cetirizine (ZYRTEC) 10 MG tablet, Take 1 tablet (10 mg total) by mouth every evening. (Patient not taking: Reported on 5/25/2023), Disp: 90 tablet, Rfl: 3    doxazosin (CARDURA) 2 MG tablet, Take 2 mg by mouth once daily., Disp: , Rfl:     fluticasone propionate (FLONASE) 50 mcg/actuation nasal spray, 1 spray (50 mcg total) by Each Nostril route 2 (two) times a day. (Patient not taking: Reported on 5/25/2023), Disp: 16 g, Rfl: 6    RECTICARE 5 % Crea, SMARTSIG:Sparingly T-DERMAL Every 3-4 Hours PRN, Disp: , Rfl:   Review of patient's allergies indicates:  No Known Allergies    BP (!) 141/84   Pulse 65   Ht 5' 10" (1.778 m)   Wt 84.4 kg (186 lb)   BMI 26.69 kg/m²     Review of Systems   Constitutional: Negative.   HENT: Negative.    Eyes: Negative.    Cardiovascular: " Negative.    Respiratory: Negative.    Endocrine: Negative.    Hematologic/Lymphatic: Negative.    Skin: Negative.    Musculoskeletal: Negative.    Gastrointestinal: Negative.    Genitourinary: Negative.    Neurological: Negative.    Psychiatric/Behavioral: Negative.    Allergic/Immunologic: Negative.        Objective:     Constitutional:   HENT:    Eyes:   Cardiovascular:   Respiratory:   Endocrine:  Hematologic/Lymphatic:   Skin:  Musculoskeletal:   Gastrointestinal:   Genitourinary:   Neurological:   Psychiatric/Behavioral:   Extremities:     Assessment:     Imaging:       Plan:

## 2023-05-25 NOTE — PROGRESS NOTES
History and Physical      Subjective:      Patient ID: Dawson Gillette is a 62 y.o. male.    Chief Complaint: Pre-op Exam (Dr. 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  He was found to have bilateral high-grade carotid stenoses, left greater than right, on screening ultrasound.  He is asymptomatic.  CT scan shows a 80%-90% stenosis on the left and approximately 60% stenosis on the right.  The patient is now admitted for left carotid endarterectomy  The procedure, risks, complications have been discussed in detail and plain language.  The patient voiced understanding and is willing to proceed.      Current Outpatient Medications:     acetaminophen (TYLENOL) 160 mg/5 mL (5 mL) Soln, Take 31.25 mLs (1,000 mg total) by mouth every 6 (six) hours as needed (pain)., Disp: 3750 mL, Rfl: 3    amLODIPine (NORVASC) 10 MG tablet, Take 1 tablet (10 mg total) by mouth once daily., Disp: 90 tablet, Rfl: 3    aspirin (ECOTRIN) 81 MG EC tablet, Take 81 mg by mouth once daily., Disp: , Rfl:     azelastine (ASTELIN) 137 mcg (0.1 %) nasal spray, 1 spray (137 mcg total) by Nasal route 2 (two) times daily., Disp: 30 mL, Rfl: 3    esomeprazole (NEXIUM) 40 mg GrPS, Take 40 mg by mouth before breakfast., Disp: 90 each, Rfl: 3    ibuprofen (ADVIL,MOTRIN) 100 mg/5 mL suspension, Take 30 mLs (600 mg total) by mouth every 8 (eight) hours as needed for Pain., Disp: 900 mL, Rfl: 11    levothyroxine (SYNTHROID) 50 MCG tablet, Take 1 tablet (50 mcg total) by mouth before breakfast., Disp: 90 tablet, Rfl: 2    rosuvastatin (CRESTOR) 40 MG Tab, Take 40 mg by mouth once daily., Disp: , Rfl:     sulfamethoxazole-trimethoprim 200-40 mg/5 ml (BACTRIM,SEPTRA) 200-40 mg/5 mL Susp, Take 5 mLs by mouth once daily., Disp: , Rfl:     cetirizine (ZYRTEC) 10 MG tablet, Take 1 tablet (10 mg total) by mouth every evening. (Patient not taking:  "Reported on 5/25/2023), Disp: 90 tablet, Rfl: 3    doxazosin (CARDURA) 2 MG tablet, Take 2 mg by mouth once daily., Disp: , Rfl:     fluticasone propionate (FLONASE) 50 mcg/actuation nasal spray, 1 spray (50 mcg total) by Each Nostril route 2 (two) times a day. (Patient not taking: Reported on 5/25/2023), Disp: 16 g, Rfl: 6    RECTICARE 5 % Crea, SMARTSIG:Sparingly T-DERMAL Every 3-4 Hours PRN, Disp: , Rfl:   Review of patient's allergies indicates:  No Known Allergies    BP (!) 141/84   Pulse 65   Ht 5' 10" (1.778 m)   Wt 84.4 kg (186 lb)   BMI 26.69 kg/m²     Review of Systems   Constitutional: Negative.   HENT: Negative.    Eyes: Negative.    Cardiovascular: Negative.    Respiratory: Negative.    Endocrine: Negative.    Hematologic/Lymphatic: Negative.    Skin: Negative.    Musculoskeletal: Negative.    Gastrointestinal: Negative.    Genitourinary: Negative.    Neurological: Negative.    Psychiatric/Behavioral: Negative.    Allergic/Immunologic: Negative.        Objective:     Constitutional:  No acute distress  HENT:  Supple without mass.  Tissues are soft and mobile.  There are bilateral soft carotid bruits  Eyes:  PERRLA  Cardiovascular:  Regular rate and rhythm without murmur rub or gallop  Respiratory:  Clear to auscultation  Endocrine:  Hematologic/Lymphatic:   Skin:  Warm and dry.  Skin of the neck is supple and mobile  Musculoskeletal:  No gross deformity  Gastrointestinal:   Genitourinary:   Neurological:  Normal  Psychiatric/Behavioral:   Extremities:  No cyanosis clubbing or edema    Assessment:  Bilateral carotid stenosis.  Left worse than right.  Patient understands that if we find serious sequelae of radiation treatment that we may have to back away from the procedure.     Imaging:  CT angiogram reviewed      Plan:  Left carotid endarterectomy on Wednesday May 31.         History and Physical      Subjective:      Patient ID: Dawson Gilletet is a 62 y.o. male.    Chief Complaint: Pre-op Exam " "(Dr. Hartman Carotid Stenosis)      HPI:       Current Outpatient Medications:     acetaminophen (TYLENOL) 160 mg/5 mL (5 mL) Soln, Take 31.25 mLs (1,000 mg total) by mouth every 6 (six) hours as needed (pain)., Disp: 3750 mL, Rfl: 3    amLODIPine (NORVASC) 10 MG tablet, Take 1 tablet (10 mg total) by mouth once daily., Disp: 90 tablet, Rfl: 3    aspirin (ECOTRIN) 81 MG EC tablet, Take 81 mg by mouth once daily., Disp: , Rfl:     azelastine (ASTELIN) 137 mcg (0.1 %) nasal spray, 1 spray (137 mcg total) by Nasal route 2 (two) times daily., Disp: 30 mL, Rfl: 3    esomeprazole (NEXIUM) 40 mg GrPS, Take 40 mg by mouth before breakfast., Disp: 90 each, Rfl: 3    ibuprofen (ADVIL,MOTRIN) 100 mg/5 mL suspension, Take 30 mLs (600 mg total) by mouth every 8 (eight) hours as needed for Pain., Disp: 900 mL, Rfl: 11    levothyroxine (SYNTHROID) 50 MCG tablet, Take 1 tablet (50 mcg total) by mouth before breakfast., Disp: 90 tablet, Rfl: 2    rosuvastatin (CRESTOR) 40 MG Tab, Take 40 mg by mouth once daily., Disp: , Rfl:     sulfamethoxazole-trimethoprim 200-40 mg/5 ml (BACTRIM,SEPTRA) 200-40 mg/5 mL Susp, Take 5 mLs by mouth once daily., Disp: , Rfl:     cetirizine (ZYRTEC) 10 MG tablet, Take 1 tablet (10 mg total) by mouth every evening. (Patient not taking: Reported on 5/25/2023), Disp: 90 tablet, Rfl: 3    doxazosin (CARDURA) 2 MG tablet, Take 2 mg by mouth once daily., Disp: , Rfl:     fluticasone propionate (FLONASE) 50 mcg/actuation nasal spray, 1 spray (50 mcg total) by Each Nostril route 2 (two) times a day. (Patient not taking: Reported on 5/25/2023), Disp: 16 g, Rfl: 6    RECTICARE 5 % Crea, SMARTSIG:Sparingly T-DERMAL Every 3-4 Hours PRN, Disp: , Rfl:   Review of patient's allergies indicates:  No Known Allergies    BP (!) 141/84   Pulse 65   Ht 5' 10" (1.778 m)   Wt 84.4 kg (186 lb)   BMI 26.69 kg/m²     Review of Systems   Constitutional: Negative.   HENT: Negative.    Eyes: Negative.    Cardiovascular: " Negative.    Respiratory: Negative.    Endocrine: Negative.    Hematologic/Lymphatic: Negative.    Skin: Negative.    Musculoskeletal: Negative.    Gastrointestinal: Negative.    Genitourinary: Negative.    Neurological: Negative.    Psychiatric/Behavioral: Negative.    Allergic/Immunologic: Negative.        Objective:     Constitutional:   HENT:    Eyes:   Cardiovascular:   Respiratory:   Endocrine:  Hematologic/Lymphatic:   Skin:  Musculoskeletal:   Gastrointestinal:   Genitourinary:   Neurological:   Psychiatric/Behavioral:   Extremities:     Assessment:     Imaging:       Plan:

## 2023-05-29 ENCOUNTER — HOSPITAL ENCOUNTER (OUTPATIENT)
Dept: RADIOLOGY | Facility: HOSPITAL | Age: 63
Discharge: HOME | DRG: 039 | End: 2023-05-29
Attending: SPECIALIST
Payer: MEDICARE

## 2023-05-29 DIAGNOSIS — I65.22 LEFT CAROTID STENOSIS: ICD-10-CM

## 2023-05-29 PROCEDURE — 71046 X-RAY EXAM CHEST 2 VIEWS: CPT | Mod: TC

## 2023-05-31 ENCOUNTER — HOSPITAL ENCOUNTER (INPATIENT)
Facility: HOSPITAL | Age: 63
LOS: 1 days | Discharge: HOME | DRG: 039 | End: 2023-06-01
Attending: THORACIC SURGERY (CARDIOTHORACIC VASCULAR SURGERY) | Admitting: THORACIC SURGERY (CARDIOTHORACIC VASCULAR SURGERY)
Payer: MEDICARE

## 2023-05-31 ENCOUNTER — ANESTHESIA (OUTPATIENT)
Dept: SURGERY | Facility: HOSPITAL | Age: 63
DRG: 039 | End: 2023-05-31
Payer: MEDICARE

## 2023-05-31 DIAGNOSIS — I65.22 CAROTID STENOSIS, LEFT: ICD-10-CM

## 2023-05-31 DIAGNOSIS — I65.22 LEFT CAROTID STENOSIS: ICD-10-CM

## 2023-05-31 PROCEDURE — 36000706: Performed by: THORACIC SURGERY (CARDIOTHORACIC VASCULAR SURGERY)

## 2023-05-31 PROCEDURE — 71000033 HC RECOVERY, INTIAL HOUR: Performed by: THORACIC SURGERY (CARDIOTHORACIC VASCULAR SURGERY)

## 2023-05-31 PROCEDURE — 25000003 PHARM REV CODE 250: Performed by: THORACIC SURGERY (CARDIOTHORACIC VASCULAR SURGERY)

## 2023-05-31 PROCEDURE — D9220A PRA ANESTHESIA: Mod: CRNA,,, | Performed by: NURSE ANESTHETIST, CERTIFIED REGISTERED

## 2023-05-31 PROCEDURE — 35301 PR THROMBOENDARTECTMY NECK,NECK INCIS: ICD-10-PCS | Mod: AS,LT,, | Performed by: PHYSICIAN ASSISTANT

## 2023-05-31 PROCEDURE — 63600175 PHARM REV CODE 636 W HCPCS

## 2023-05-31 PROCEDURE — 71000015 HC POSTOP RECOV 1ST HR: Performed by: THORACIC SURGERY (CARDIOTHORACIC VASCULAR SURGERY)

## 2023-05-31 PROCEDURE — D9220A PRA ANESTHESIA: ICD-10-PCS | Mod: CRNA,,, | Performed by: NURSE ANESTHETIST, CERTIFIED REGISTERED

## 2023-05-31 PROCEDURE — 35301 RECHANNELING OF ARTERY: CPT | Mod: AS,LT,, | Performed by: PHYSICIAN ASSISTANT

## 2023-05-31 PROCEDURE — D9220A PRA ANESTHESIA: ICD-10-PCS | Mod: ANES,,, | Performed by: ANESTHESIOLOGY

## 2023-05-31 PROCEDURE — 35301 PR THROMBOENDARTECTMY NECK,NECK INCIS: ICD-10-PCS | Mod: LT,,, | Performed by: THORACIC SURGERY (CARDIOTHORACIC VASCULAR SURGERY)

## 2023-05-31 PROCEDURE — 71000016 HC POSTOP RECOV ADDL HR: Performed by: THORACIC SURGERY (CARDIOTHORACIC VASCULAR SURGERY)

## 2023-05-31 PROCEDURE — 63600175 PHARM REV CODE 636 W HCPCS: Performed by: ANESTHESIOLOGY

## 2023-05-31 PROCEDURE — 37000009 HC ANESTHESIA EA ADD 15 MINS: Performed by: THORACIC SURGERY (CARDIOTHORACIC VASCULAR SURGERY)

## 2023-05-31 PROCEDURE — C1768 GRAFT, VASCULAR: HCPCS | Performed by: THORACIC SURGERY (CARDIOTHORACIC VASCULAR SURGERY)

## 2023-05-31 PROCEDURE — 25000003 PHARM REV CODE 250: Performed by: PHYSICIAN ASSISTANT

## 2023-05-31 PROCEDURE — 36620 INSERTION CATHETER ARTERY: CPT | Performed by: ANESTHESIOLOGY

## 2023-05-31 PROCEDURE — 36620 ARTERIAL: ICD-10-PCS | Mod: 59,,, | Performed by: ANESTHESIOLOGY

## 2023-05-31 PROCEDURE — 88304 TISSUE EXAM BY PATHOLOGIST: CPT | Performed by: THORACIC SURGERY (CARDIOTHORACIC VASCULAR SURGERY)

## 2023-05-31 PROCEDURE — 35301 RECHANNELING OF ARTERY: CPT | Mod: LT,,, | Performed by: THORACIC SURGERY (CARDIOTHORACIC VASCULAR SURGERY)

## 2023-05-31 PROCEDURE — C1729 CATH, DRAINAGE: HCPCS | Performed by: THORACIC SURGERY (CARDIOTHORACIC VASCULAR SURGERY)

## 2023-05-31 PROCEDURE — 25000003 PHARM REV CODE 250

## 2023-05-31 PROCEDURE — 36000707: Performed by: THORACIC SURGERY (CARDIOTHORACIC VASCULAR SURGERY)

## 2023-05-31 PROCEDURE — 63600175 PHARM REV CODE 636 W HCPCS: Performed by: THORACIC SURGERY (CARDIOTHORACIC VASCULAR SURGERY)

## 2023-05-31 PROCEDURE — 88311 DECALCIFY TISSUE: CPT

## 2023-05-31 PROCEDURE — 11000001 HC ACUTE MED/SURG PRIVATE ROOM

## 2023-05-31 PROCEDURE — D9220A PRA ANESTHESIA: Mod: ANES,,, | Performed by: ANESTHESIOLOGY

## 2023-05-31 PROCEDURE — 71000039 HC RECOVERY, EACH ADD'L HOUR: Performed by: THORACIC SURGERY (CARDIOTHORACIC VASCULAR SURGERY)

## 2023-05-31 PROCEDURE — 37000008 HC ANESTHESIA 1ST 15 MINUTES: Performed by: THORACIC SURGERY (CARDIOTHORACIC VASCULAR SURGERY)

## 2023-05-31 DEVICE — PATCH XENOSURE TAPR .8X8CM: Type: IMPLANTABLE DEVICE | Site: NECK | Status: FUNCTIONAL

## 2023-05-31 RX ORDER — ROCURONIUM BROMIDE 10 MG/ML
INJECTION, SOLUTION INTRAVENOUS
Status: DISCONTINUED | OUTPATIENT
Start: 2023-05-31 | End: 2023-05-31

## 2023-05-31 RX ORDER — HYDROCODONE BITARTRATE AND ACETAMINOPHEN 5; 325 MG/1; MG/1
1 TABLET ORAL EVERY 4 HOURS PRN
Status: DISCONTINUED | OUTPATIENT
Start: 2023-05-31 | End: 2023-05-31

## 2023-05-31 RX ORDER — ASPIRIN 81 MG/1
81 TABLET ORAL DAILY
Status: DISCONTINUED | OUTPATIENT
Start: 2023-05-31 | End: 2023-06-01 | Stop reason: HOSPADM

## 2023-05-31 RX ORDER — HYDROCODONE BITARTRATE AND ACETAMINOPHEN 7.5; 325 MG/15ML; MG/15ML
5 SOLUTION ORAL EVERY 4 HOURS PRN
Status: DISCONTINUED | OUTPATIENT
Start: 2023-05-31 | End: 2023-06-01 | Stop reason: HOSPADM

## 2023-05-31 RX ORDER — LIDOCAINE HYDROCHLORIDE 20 MG/ML
INJECTION INTRAVENOUS
Status: DISCONTINUED | OUTPATIENT
Start: 2023-05-31 | End: 2023-05-31

## 2023-05-31 RX ORDER — HEPARIN SODIUM 5000 [USP'U]/ML
INJECTION, SOLUTION INTRAVENOUS; SUBCUTANEOUS
Status: DISCONTINUED | OUTPATIENT
Start: 2023-05-31 | End: 2023-05-31 | Stop reason: HOSPADM

## 2023-05-31 RX ORDER — FENTANYL CITRATE 50 UG/ML
INJECTION, SOLUTION INTRAMUSCULAR; INTRAVENOUS
Status: DISCONTINUED | OUTPATIENT
Start: 2023-05-31 | End: 2023-05-31

## 2023-05-31 RX ORDER — ONDANSETRON 2 MG/ML
INJECTION INTRAMUSCULAR; INTRAVENOUS
Status: DISCONTINUED | OUTPATIENT
Start: 2023-05-31 | End: 2023-05-31

## 2023-05-31 RX ORDER — SODIUM CHLORIDE 0.9 % (FLUSH) 0.9 %
10 SYRINGE (ML) INJECTION
Status: DISCONTINUED | OUTPATIENT
Start: 2023-05-31 | End: 2023-05-31 | Stop reason: HOSPADM

## 2023-05-31 RX ORDER — EPHEDRINE SULFATE 50 MG/ML
INJECTION, SOLUTION INTRAVENOUS
Status: DISCONTINUED | OUTPATIENT
Start: 2023-05-31 | End: 2023-05-31

## 2023-05-31 RX ORDER — MIDAZOLAM HYDROCHLORIDE 1 MG/ML
INJECTION INTRAMUSCULAR; INTRAVENOUS
Status: DISCONTINUED | OUTPATIENT
Start: 2023-05-31 | End: 2023-05-31

## 2023-05-31 RX ORDER — FENTANYL CITRATE 50 UG/ML
25 INJECTION, SOLUTION INTRAMUSCULAR; INTRAVENOUS EVERY 5 MIN PRN
Status: DISCONTINUED | OUTPATIENT
Start: 2023-05-31 | End: 2023-05-31 | Stop reason: HOSPADM

## 2023-05-31 RX ORDER — PHENYLEPHRINE HCL IN 0.9% NACL 1 MG/10 ML
SYRINGE (ML) INTRAVENOUS
Status: DISCONTINUED | OUTPATIENT
Start: 2023-05-31 | End: 2023-05-31

## 2023-05-31 RX ORDER — PROTAMINE SULFATE 10 MG/ML
INJECTION, SOLUTION INTRAVENOUS
Status: DISCONTINUED | OUTPATIENT
Start: 2023-05-31 | End: 2023-05-31

## 2023-05-31 RX ORDER — DEXAMETHASONE SODIUM PHOSPHATE 4 MG/ML
INJECTION, SOLUTION INTRA-ARTICULAR; INTRALESIONAL; INTRAMUSCULAR; INTRAVENOUS; SOFT TISSUE
Status: DISCONTINUED | OUTPATIENT
Start: 2023-05-31 | End: 2023-05-31

## 2023-05-31 RX ORDER — LIDOCAINE HYDROCHLORIDE 10 MG/ML
1 INJECTION, SOLUTION EPIDURAL; INFILTRATION; INTRACAUDAL; PERINEURAL ONCE
Status: CANCELLED | OUTPATIENT
Start: 2023-05-31 | End: 2023-05-31

## 2023-05-31 RX ORDER — HYDROMORPHONE HYDROCHLORIDE 2 MG/ML
0.2 INJECTION, SOLUTION INTRAMUSCULAR; INTRAVENOUS; SUBCUTANEOUS EVERY 5 MIN PRN
Status: DISCONTINUED | OUTPATIENT
Start: 2023-05-31 | End: 2023-05-31 | Stop reason: HOSPADM

## 2023-05-31 RX ORDER — ACETAMINOPHEN 10 MG/ML
1000 INJECTION, SOLUTION INTRAVENOUS ONCE
Status: COMPLETED | OUTPATIENT
Start: 2023-05-31 | End: 2023-05-31

## 2023-05-31 RX ORDER — PROPOFOL 10 MG/ML
VIAL (ML) INTRAVENOUS
Status: DISCONTINUED | OUTPATIENT
Start: 2023-05-31 | End: 2023-05-31

## 2023-05-31 RX ORDER — HEPARIN SODIUM 1000 [USP'U]/ML
INJECTION, SOLUTION INTRAVENOUS; SUBCUTANEOUS
Status: DISCONTINUED | OUTPATIENT
Start: 2023-05-31 | End: 2023-05-31

## 2023-05-31 RX ORDER — FAMOTIDINE 10 MG/ML
20 INJECTION INTRAVENOUS ONCE
Status: CANCELLED | OUTPATIENT
Start: 2023-05-31

## 2023-05-31 RX ORDER — MUPIROCIN 20 MG/G
OINTMENT TOPICAL 2 TIMES DAILY
Status: DISCONTINUED | OUTPATIENT
Start: 2023-05-31 | End: 2023-06-01 | Stop reason: HOSPADM

## 2023-05-31 RX ADMIN — EPHEDRINE SULFATE 5 MG: 50 INJECTION INTRAVENOUS at 08:05

## 2023-05-31 RX ADMIN — HEPARIN SODIUM 2000 UNITS: 1000 INJECTION, SOLUTION INTRAVENOUS; SUBCUTANEOUS at 08:05

## 2023-05-31 RX ADMIN — HYDROCODONE BITARTRATE AND ACETAMINOPHEN 10 ML: 7.5; 325 SOLUTION ORAL at 06:05

## 2023-05-31 RX ADMIN — DEXAMETHASONE SODIUM PHOSPHATE 8 MG: 4 INJECTION, SOLUTION INTRA-ARTICULAR; INTRALESIONAL; INTRAMUSCULAR; INTRAVENOUS; SOFT TISSUE at 07:05

## 2023-05-31 RX ADMIN — HEPARIN SODIUM 8500 UNITS: 1000 INJECTION, SOLUTION INTRAVENOUS; SUBCUTANEOUS at 07:05

## 2023-05-31 RX ADMIN — EPHEDRINE SULFATE 10 MG: 50 INJECTION INTRAVENOUS at 07:05

## 2023-05-31 RX ADMIN — EPHEDRINE SULFATE 20 MG: 50 INJECTION INTRAVENOUS at 09:05

## 2023-05-31 RX ADMIN — Medication 100 MCG: at 07:05

## 2023-05-31 RX ADMIN — HYDROMORPHONE HYDROCHLORIDE 0.2 MG: 2 INJECTION INTRAMUSCULAR; INTRAVENOUS; SUBCUTANEOUS at 11:05

## 2023-05-31 RX ADMIN — ROCURONIUM BROMIDE 60 MG: 10 SOLUTION INTRAVENOUS at 07:05

## 2023-05-31 RX ADMIN — FENTANYL CITRATE 50 MCG: 50 INJECTION, SOLUTION INTRAMUSCULAR; INTRAVENOUS at 09:05

## 2023-05-31 RX ADMIN — Medication 50 MCG: at 08:05

## 2023-05-31 RX ADMIN — ROCURONIUM BROMIDE 40 MG: 10 SOLUTION INTRAVENOUS at 07:05

## 2023-05-31 RX ADMIN — PROTAMINE SULFATE 100 MG: 10 INJECTION, SOLUTION INTRAVENOUS at 09:05

## 2023-05-31 RX ADMIN — SODIUM CHLORIDE, SODIUM GLUCONATE, SODIUM ACETATE, POTASSIUM CHLORIDE AND MAGNESIUM CHLORIDE: 526; 502; 368; 37; 30 INJECTION, SOLUTION INTRAVENOUS at 06:05

## 2023-05-31 RX ADMIN — LIDOCAINE HYDROCHLORIDE 80 MG: 20 INJECTION INTRAVENOUS at 07:05

## 2023-05-31 RX ADMIN — FENTANYL CITRATE 50 MCG: 50 INJECTION, SOLUTION INTRAMUSCULAR; INTRAVENOUS at 07:05

## 2023-05-31 RX ADMIN — PHENYLEPHRINE HYDROCHLORIDE 50 MCG/MIN: 10 INJECTION INTRAVENOUS at 07:05

## 2023-05-31 RX ADMIN — PROPOFOL 50 MG: 10 INJECTION, EMULSION INTRAVENOUS at 07:05

## 2023-05-31 RX ADMIN — ONDANSETRON 4 MG: 2 INJECTION INTRAMUSCULAR; INTRAVENOUS at 09:05

## 2023-05-31 RX ADMIN — PROPOFOL 170 MG: 10 INJECTION, EMULSION INTRAVENOUS at 07:05

## 2023-05-31 RX ADMIN — DEXTROSE MONOHYDRATE 1.5 G: 50 INJECTION, SOLUTION INTRAVENOUS at 07:05

## 2023-05-31 RX ADMIN — ACETAMINOPHEN 1000 MG: 10 INJECTION, SOLUTION INTRAVENOUS at 10:05

## 2023-05-31 RX ADMIN — ASPIRIN 81 MG: 81 TABLET, COATED ORAL at 09:05

## 2023-05-31 RX ADMIN — HYDROCODONE BITARTRATE AND ACETAMINOPHEN 10 ML: 7.5; 325 SOLUTION ORAL at 12:05

## 2023-05-31 RX ADMIN — MIDAZOLAM HYDROCHLORIDE 2 MG: 1 INJECTION, SOLUTION INTRAMUSCULAR; INTRAVENOUS at 06:05

## 2023-05-31 RX ADMIN — MUPIROCIN: 20 OINTMENT TOPICAL at 08:05

## 2023-05-31 RX ADMIN — SUGAMMADEX 200 MG: 100 INJECTION, SOLUTION INTRAVENOUS at 09:05

## 2023-05-31 NOTE — ANESTHESIA PREPROCEDURE EVALUATION
05/31/2023  Dawson Gillette is a 62 y.o., male .      Pre-op Assessment    I have reviewed the Patient Summary Reports.     I have reviewed the Nursing Notes. I have reviewed the NPO Status.   I have reviewed the Medications.     Review of Systems  Anesthesia Hx:  No problems with previous Anesthesia    Social:  Non-Smoker    Hematology/Oncology:        Oncology Comments: Hx tonsil CA  Hx neck radiation, required throat dilation in the past  Effect to the airway unknown     Cardiovascular:   Hypertension Denies MI.    Denies Angina.  Denies CHF. hyperlipidemia Bilateral carotid artery stenosis  Left 80%  Right 40%   Pulmonary:   Denies COPD.  Denies Asthma.    Renal/:   Denies Chronic Renal Disease. no renal calculi BPH    Hepatic/GI:   GERD, well controlled Denies Liver Disease. Denies Hepatitis.    Neurological:   Denies CVA. Denies Seizures.   Chronic Pain Syndrome (low back pain)   Endocrine:   Hypothyroidism        Physical Exam  General: Well nourished, Cooperative, Alert and Oriented    Airway:  Mallampati: I   Mouth Opening: Normal  TM Distance: Normal  Tongue: Normal  Neck ROM: Normal ROM    Dental:  Edentulous        Anesthesia Plan  Type of Anesthesia, risks & benefits discussed:    Anesthesia Type: Gen ETT  Intra-op Monitoring Plan: Standard ASA Monitors and Art Line  Induction:  IV  Airway Plan: Direct and Video  Informed Consent: Informed consent signed with the Patient and all parties understand the risks and agree with anesthesia plan.  All questions answered. Patient consented to blood products? Yes  ASA Score: 3  Day of Surgery Review of History & Physical: H&P Update referred to the surgeon/provider.    Ready For Surgery From Anesthesia Perspective.     .

## 2023-05-31 NOTE — OP NOTE
[facility]                       [address] [id number]    PATIENT NAME:      ALEENA GOODE  YOB: 1960  CSN:               [id number]  MRN:               [id number]  ADMIT DATE:        05/31/2023 04:52:00  PHYSICIAN:         Chito Che MD                          OPERATIVE REPORT      DATE OF SURGERY:    05/31/2023 00:00:00    SURGEON:  Chito Che MD    PREOPERATIVE DIAGNOSIS:  Left carotid stenosis.    POSTOPERATIVE DIAGNOSIS:  Left carotid stenosis.    PROCEDURE PERFORMED:  Left carotid endarterectomy with bovine pericardial patch   repair.    ASSISTANT:  Anjum Goldstein PA-C.    INDICATIONS:  The patient is a 62-year-old gentleman who had tonsillar cancer   approximately 10 years ago and received chemotherapy and had his neck   irradiated.  He has been followed for an asymptomatic left carotid stenosis.    The most recent ultrasound shows this to be a high velocity lesion and CT   angiogram shows an 80% signet ring stenosis just above the bifurcation.  The   patient is now brought to surgery for carotid endarterectomy.    DESCRIPTION OF PROCEDURE:  The patient is brought to the operating room and   after placement of the appropriate monitoring lines, was anesthetized with   general endotracheal anesthesia.  The left neck was prepped and draped in   standard fashion.  A vertical incision paralleling the anterior border of the   sternocleidomastoid was made and carried down through platysma muscle.    Hemostasis was with electrocautery.  The sternocleidomastoid and internal   jugular vein were retracted laterally.  The common carotid artery was easily   identified and isolated.  The patient was systemically heparinized.  Antegrade   dissection then identified both branches of the carotid artery.  The hypoglossal   nerve was easily identified and spared.  The proximal and distal  control was   obtained on all vessels.  A longitudinal arteriotomy was initiated on the common   carotid, brought through the bifurcation and the plaque up to and into the   internal carotid.  Backbleeding from the internal carotid was brisk and   pulsatile, and so no shunt was used.  Standard endarterectomy technique was used   to explant the plaque and the thickened post irradiation intima.  Slips of   intima were resected using copious amounts of heparinized saline irrigation.    The intima of the distal internal carotid was firmly attached and did not   require any tacking sutures.  A segment of bovine pericardial patch material was   brought to the field and anastomosed using running 6-0 Prolene.  Prior to   completion of the repair, the site was flushed and back bled.  The repair was   completed and flow was restored from the common to the external carotid and then   finally to the internal carotid.  Hemostasis at all sites was secured and   Protamine was administered.  A 7 mm flat Eduardo-Smith drain was placed through   a separate stab incision, sutured to the skin and connected to bulb suction.    Platysma was closed with a running 3-0 Vicryl and the skin was closed with a 3-0   Monocryl subcuticular stitch.  Sterile dressings were placed and the patient   was awakened and transported to the recovery room.        ______________________________  MD JAMESON Millard/LISSETTE  DD:  05/31/2023  Time:  09:29AM  DT:  05/31/2023  Time:  12:05PM  Job #:  [id number]      OPERATIVE REPORT

## 2023-05-31 NOTE — ANESTHESIA PROCEDURE NOTES
Intubation    Date/Time: 5/31/2023 7:07 AM  Performed by: Denise Orta  Authorized by: Eduar Hobbs DO     Intubation:     Induction:  Intravenous    Intubated:  Postinduction    Mask Ventilation:  Easy mask    Attempts:  1    Attempted By:  Student    Method of Intubation:  Video laryngoscopy    Blade:  Valencia 4    Laryngeal View Grade: Grade I - full view of cords      Difficult Airway Encountered?: No      Complications:  None    Airway Device:  Oral endotracheal tube    Airway Device Size:  7.5    Style/Cuff Inflation:  Cuffed (inflated to minimal occlusive pressure)    Tube secured:  22    Placement Verified By:  Capnometry    Complicating Factors:  None    Findings Post-Intubation:  BS equal bilateral and atraumatic/condition of teeth unchanged

## 2023-05-31 NOTE — ANESTHESIA PROCEDURE NOTES
Arterial    Diagnosis: carotid stenosis    Patient location during procedure: pre-op  Procedure start time: 5/31/2023 6:20 AM  Timeout: 5/31/2023 6:20 AM  Procedure end time: 5/31/2023 6:25 AM    Staffing  Authorizing Provider: Eduar Hobbs DO  Performing Provider: Eduar Hobbs DO    Anesthesiologist was present at the time of the procedure.    Preanesthetic Checklist  Completed: patient identified, IV checked, site marked, risks and benefits discussed, surgical consent, monitors and equipment checked, pre-op evaluation, timeout performed and anesthesia consent givenArterial  Skin Prep: chlorhexidine gluconate  Local Infiltration: lidocaine  Orientation: right  Location: femoral    Catheter Size: 20 G  Catheter placement by Ultrasound guidance. Heme positive aspiration all ports.   Vessel Caliber: medium, patent, compressibility normal  Vascular Doppler:  not done  Needle advanced into vessel with real time Ultrasound guidance.Insertion Attempts: 1  Assessment  Dressing: secured with tape and tegaderm  Patient: Tolerated well

## 2023-05-31 NOTE — BRIEF OP NOTE
[facility]  Brief Post-Operative Note    SUMMARY     Surgery Date: 5/31/2023     Procedure Performed: LCEA w/ bovine pericardial patch    Surgeon(s) and Role: Chito Che MD - Primary    Assistant: Anjum Goldstein PA-C    Pre-op Diagnosis:  Lt carotid stenosis    Post-op Diagnosis:  same    Anesthesia: General    Operative Findings:     Estimated Blood Loss: 100cc         Specimens:

## 2023-05-31 NOTE — ANESTHESIA POSTPROCEDURE EVALUATION
Anesthesia Post Evaluation    Patient: Dawson Gillette    Procedure(s) Performed: Procedure(s) (LRB):  ENDARTERECTOMY, CAROTID (Left)    Final Anesthesia Type: general      Patient location during evaluation: PACU  Patient participation: Yes- Able to Participate  Level of consciousness: awake and alert  Post-procedure vital signs: reviewed and stable  Pain management: adequate  Airway patency: patent    PONV status at discharge: No PONV  Anesthetic complications: no      Cardiovascular status: blood pressure returned to baseline  Respiratory status: spontaneous ventilation and unassisted  Hydration status: euvolemic  Follow-up needed           Vitals Value Taken Time   /71 05/31/23 1534   Temp 36.4 °C (97.5 °F) 05/31/23 1534   Pulse 68 05/31/23 1534   Resp 18 05/31/23 1534   SpO2 98 % 05/31/23 1534         Event Time   Out of Recovery 14:31:45         Pain/Marco A Score: Pain Rating Prior to Med Admin: 4 (5/31/2023 12:27 PM)  Marco A Score: 10 (5/31/2023  2:00 PM)

## 2023-05-31 NOTE — TRANSFER OF CARE
"Anesthesia Transfer of Care Note    Patient: Dawson Gillette    Procedure(s) Performed: Procedure(s) (LRB):  ENDARTERECTOMY, CAROTID (Left)    Patient location: PACU    Anesthesia Type: general    Transport from OR: Transported from OR on room air with adequate spontaneous ventilation    Post pain: adequate analgesia    Post assessment: no apparent anesthetic complications and tolerated procedure well    Post vital signs: stable    Level of consciousness: awake and alert    Nausea/Vomiting: no nausea/vomiting    Complications: none    Transfer of care protocol was followed      Last vitals:   Visit Vitals  BP (!) 148/81   Pulse 64   Temp 36.7 °C (98.1 °F) (Oral)   Resp 17   Ht 5' 10" (1.778 m)   Wt 82.7 kg (182 lb 5.1 oz)   SpO2 98%   BMI 26.16 kg/m²     "

## 2023-06-01 VITALS
TEMPERATURE: 98 F | RESPIRATION RATE: 18 BRPM | SYSTOLIC BLOOD PRESSURE: 129 MMHG | BODY MASS INDEX: 26.1 KG/M2 | HEIGHT: 70 IN | WEIGHT: 182.31 LBS | HEART RATE: 67 BPM | OXYGEN SATURATION: 97 % | DIASTOLIC BLOOD PRESSURE: 74 MMHG

## 2023-06-01 PROCEDURE — 99024 PR POST-OP FOLLOW-UP VISIT: ICD-10-PCS | Mod: ,,, | Performed by: PHYSICIAN ASSISTANT

## 2023-06-01 PROCEDURE — 25000003 PHARM REV CODE 250: Performed by: THORACIC SURGERY (CARDIOTHORACIC VASCULAR SURGERY)

## 2023-06-01 PROCEDURE — 25000003 PHARM REV CODE 250: Performed by: PHYSICIAN ASSISTANT

## 2023-06-01 PROCEDURE — 99024 POSTOP FOLLOW-UP VISIT: CPT | Mod: ,,, | Performed by: PHYSICIAN ASSISTANT

## 2023-06-01 RX ORDER — HYDROCODONE BITARTRATE AND ACETAMINOPHEN 7.5; 325 MG/15ML; MG/15ML
5 SOLUTION ORAL EVERY 4 HOURS PRN
Qty: 200 ML | Refills: 0 | Status: SHIPPED | OUTPATIENT
Start: 2023-06-01 | End: 2023-06-20

## 2023-06-01 RX ADMIN — HYDROCODONE BITARTRATE AND ACETAMINOPHEN 10 ML: 7.5; 325 SOLUTION ORAL at 07:06

## 2023-06-01 RX ADMIN — MUPIROCIN: 20 OINTMENT TOPICAL at 08:06

## 2023-06-01 RX ADMIN — ASPIRIN 81 MG: 81 TABLET, COATED ORAL at 08:06

## 2023-06-01 RX ADMIN — HYDROCODONE BITARTRATE AND ACETAMINOPHEN 10 ML: 7.5; 325 SOLUTION ORAL at 01:06

## 2023-06-02 LAB — PSYCHE PATHOLOGY RESULT: NORMAL

## 2023-06-02 NOTE — DISCHARGE SUMMARY
[facility] - 6th Floor Medical Telemetry  Cardiothoracic Surgery  Discharge Summary      Patient Name: Dawson Gillette  MRN: [id number]  Admission Date: 5/31/2023  Hospital Length of Stay: 1 days  Discharge Date and Time: 6/1/2023 12:30 PM  Attending Physician: No att. providers found   Discharging Provider: RAQUEL Hanks  Primary Care Provider: Lea Hunter NP    HPI:   No notes on file    Procedure(s) (LRB):  ENDARTERECTOMY, CAROTID (Left)      Indwelling Lines/Drains at time of discharge:   Lines/Drains/Airways     None               Hospital Course: No notes on file    Goals of Care Treatment Preferences:             Significant Diagnostic Studies: Labs: All labs within the past 24 hours have been reviewed    Pending Diagnostic Studies:     Procedure Component Value Units Date/Time    Specimen to Pathology [[id number]] Collected: 05/31/23 0806    Order Status: Sent Lab Status: In process Updated: 05/31/23 1646    Specimen: Tissue from Carotid           No new Assessment & Plan notes have been filed under this hospital service since the last note was generated.  Service: Cardiothoracic Surgery    Final Active Diagnoses:    Diagnosis Date Noted POA    PRINCIPAL PROBLEM:  Bilateral carotid artery stenosis [I65.23] 07/21/2022 Yes      Problems Resolved During this Admission:      Discharged Condition: good    Disposition: Home or Self Care    Follow Up:   Follow-up Information     Lea Hunter NP Follow up on 6/6/2023.    Specialty: Nurse Practitioner  Why: @7:15  Contact information:  [address] [id number]  [phone]             Chito Che MD Follow up on 6/22/2023.    Specialties: Cardiothoracic Surgery, Cardiology  Why: @ 9:15  Contact information:  [address] [place] [address]  [address]  [facility] [address]  [phone]                       Patient Instructions:   No discharge procedures on file.  Medications:  Reconciled Home Medications:      Medication  List      START taking these medications    hydrocodone-apap 7.5-325 MG/15 ML oral solution  Commonly known as: HYCET  Take 10 mLs by mouth every 4 (four) hours as needed for Pain.        CHANGE how you take these medications    amLODIPine 10 MG tablet  Commonly known as: NORVASC  Take 1 tablet (10 mg total) by mouth once daily.  What changed: when to take this     azelastine 137 mcg (0.1 %) nasal spray  Commonly known as: ASTELIN  1 spray (137 mcg total) by Nasal route 2 (two) times daily.  What changed:   · when to take this  · reasons to take this     fluticasone propionate 50 mcg/actuation nasal spray  Commonly known as: FLONASE  1 spray (50 mcg total) by Each Nostril route 2 (two) times a day.  What changed:   · when to take this  · reasons to take this        CONTINUE taking these medications    acetaminophen 160 mg/5 mL (5 mL) Soln  Commonly known as: TYLENOL  Take 31.25 mLs (1,000 mg total) by mouth every 6 (six) hours as needed (pain).     aspirin 81 MG EC tablet  Commonly known as: ECOTRIN  Take 81 mg by mouth once daily.     AZO STANDARD MAXIMUM STRENGTH ORAL  Take 2 tablets by mouth 3 (three) times daily as needed. When prostatitis flares 2 tabs TID for 2 days     esomeprazole 40 mg Grps  Commonly known as: NEXIUM  Take 40 mg by mouth before breakfast.     ibuprofen 20 mg/mL oral liquid  Take 30 mLs (600 mg total) by mouth every 8 (eight) hours as needed for Pain.     levothyroxine 50 MCG tablet  Commonly known as: SYNTHROID  Take 1 tablet (50 mcg total) by mouth before breakfast.     rosuvastatin 40 MG Tab  Commonly known as: CRESTOR  Take 40 mg by mouth once daily.     sulfamethoxazole-trimethoprim 200-40 mg/5 ml 200-40 mg/5 mL Susp  Commonly known as: BACTRIM,SEPTRA  Take 5 mLs by mouth once daily.        STOP taking these medications    cetirizine 10 MG tablet  Commonly known as: ZYRTEC     doxazosin 2 MG tablet  Commonly known as: CARDURA     RECTICARE 5 % Crea  Generic drug: LIDOcaine          Time  spent on the discharge of patient: 33 minutes    RAQUEL Hanks  Cardiothoracic Surgery  [facility] - 6th Floor Medical Telemetry

## 2023-06-05 ENCOUNTER — PATIENT MESSAGE (OUTPATIENT)
Dept: ADMINISTRATIVE | Facility: OTHER | Age: 63
End: 2023-06-05
Payer: MEDICARE

## 2023-06-05 ENCOUNTER — TELEPHONE (OUTPATIENT)
Dept: CARDIAC SURGERY | Facility: CLINIC | Age: 63
End: 2023-06-05
Payer: MEDICARE

## 2023-06-05 NOTE — TELEPHONE ENCOUNTER
Inst to keep clean with soap and water and pat dry.  Don't apply any ointments at this time.  Denies s/sx of infection.  Verb understanding.   ----- Message from Rafael Wilson sent at 6/5/2023  1:05 PM CDT -----  Regarding: questions  Contact: pts wife Negra [phone]  Would it be ok to put Bactroban ointment on incision?

## 2023-06-06 ENCOUNTER — OFFICE VISIT (OUTPATIENT)
Dept: INTERNAL MEDICINE | Facility: CLINIC | Age: 63
End: 2023-06-06
Payer: MEDICARE

## 2023-06-06 VITALS
RESPIRATION RATE: 20 BRPM | WEIGHT: 184.19 LBS | HEIGHT: 70 IN | SYSTOLIC BLOOD PRESSURE: 133 MMHG | DIASTOLIC BLOOD PRESSURE: 82 MMHG | TEMPERATURE: 98 F | HEART RATE: 69 BPM | BODY MASS INDEX: 26.37 KG/M2

## 2023-06-06 DIAGNOSIS — J01.10 ACUTE NON-RECURRENT FRONTAL SINUSITIS: ICD-10-CM

## 2023-06-06 DIAGNOSIS — Z98.890 S/P CAROTID ENDARTERECTOMY: ICD-10-CM

## 2023-06-06 PROCEDURE — 99214 OFFICE O/P EST MOD 30 MIN: CPT | Mod: S$PBB,,, | Performed by: NURSE PRACTITIONER

## 2023-06-06 PROCEDURE — 1111F PR DISCHARGE MEDS RECONCILED W/ CURRENT OUTPATIENT MED LIST: ICD-10-PCS | Mod: CPTII,,, | Performed by: NURSE PRACTITIONER

## 2023-06-06 PROCEDURE — 1159F MED LIST DOCD IN RCRD: CPT | Mod: CPTII,,, | Performed by: NURSE PRACTITIONER

## 2023-06-06 PROCEDURE — 3075F PR MOST RECENT SYSTOLIC BLOOD PRESS GE 130-139MM HG: ICD-10-PCS | Mod: CPTII,,, | Performed by: NURSE PRACTITIONER

## 2023-06-06 PROCEDURE — 3079F DIAST BP 80-89 MM HG: CPT | Mod: CPTII,,, | Performed by: NURSE PRACTITIONER

## 2023-06-06 PROCEDURE — 1160F PR REVIEW ALL MEDS BY PRESCRIBER/CLIN PHARMACIST DOCUMENTED: ICD-10-PCS | Mod: CPTII,,, | Performed by: NURSE PRACTITIONER

## 2023-06-06 PROCEDURE — 99215 OFFICE O/P EST HI 40 MIN: CPT | Mod: PBBFAC | Performed by: NURSE PRACTITIONER

## 2023-06-06 PROCEDURE — 1159F PR MEDICATION LIST DOCUMENTED IN MEDICAL RECORD: ICD-10-PCS | Mod: CPTII,,, | Performed by: NURSE PRACTITIONER

## 2023-06-06 PROCEDURE — 99214 PR OFFICE/OUTPT VISIT, EST, LEVL IV, 30-39 MIN: ICD-10-PCS | Mod: S$PBB,,, | Performed by: NURSE PRACTITIONER

## 2023-06-06 PROCEDURE — 1160F RVW MEDS BY RX/DR IN RCRD: CPT | Mod: CPTII,,, | Performed by: NURSE PRACTITIONER

## 2023-06-06 PROCEDURE — 1111F DSCHRG MED/CURRENT MED MERGE: CPT | Mod: CPTII,,, | Performed by: NURSE PRACTITIONER

## 2023-06-06 PROCEDURE — 3075F SYST BP GE 130 - 139MM HG: CPT | Mod: CPTII,,, | Performed by: NURSE PRACTITIONER

## 2023-06-06 PROCEDURE — 3008F PR BODY MASS INDEX (BMI) DOCUMENTED: ICD-10-PCS | Mod: CPTII,,, | Performed by: NURSE PRACTITIONER

## 2023-06-06 PROCEDURE — 3008F BODY MASS INDEX DOCD: CPT | Mod: CPTII,,, | Performed by: NURSE PRACTITIONER

## 2023-06-06 PROCEDURE — 3079F PR MOST RECENT DIASTOLIC BLOOD PRESSURE 80-89 MM HG: ICD-10-PCS | Mod: CPTII,,, | Performed by: NURSE PRACTITIONER

## 2023-06-06 RX ORDER — CETIRIZINE HYDROCHLORIDE 10 MG/1
10 TABLET ORAL NIGHTLY
Qty: 30 TABLET | Refills: 0 | Status: SHIPPED | OUTPATIENT
Start: 2023-06-06 | End: 2023-06-20 | Stop reason: SDUPTHER
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
## 2023-06-15 ENCOUNTER — HOSPITAL ENCOUNTER (OUTPATIENT)
Dept: RADIOLOGY | Facility: HOSPITAL | Age: 63
Discharge: HOME | End: 2023-06-15
Attending: NURSE PRACTITIONER
Payer: MEDICARE

## 2023-06-15 DIAGNOSIS — G89.29 CHRONIC BILATERAL LOW BACK PAIN WITHOUT SCIATICA: ICD-10-CM

## 2023-06-15 DIAGNOSIS — M54.50 CHRONIC BILATERAL LOW BACK PAIN WITHOUT SCIATICA: ICD-10-CM

## 2023-06-15 PROCEDURE — 72110 X-RAY EXAM L-2 SPINE 4/>VWS: CPT | Mod: TC

## 2023-06-16 ENCOUNTER — PATIENT MESSAGE (OUTPATIENT)
Dept: ADMINISTRATIVE | Facility: OTHER | Age: 63
End: 2023-06-16
Payer: MEDICARE

## 2023-06-20 ENCOUNTER — OFFICE VISIT (OUTPATIENT)
Dept: INTERNAL MEDICINE | Facility: CLINIC | Age: 63
End: 2023-06-20
Payer: MEDICARE

## 2023-06-20 VITALS
TEMPERATURE: 98 F | SYSTOLIC BLOOD PRESSURE: 128 MMHG | DIASTOLIC BLOOD PRESSURE: 80 MMHG | HEART RATE: 69 BPM | BODY MASS INDEX: 27.17 KG/M2 | WEIGHT: 189.81 LBS | RESPIRATION RATE: 20 BRPM | HEIGHT: 70 IN

## 2023-06-20 DIAGNOSIS — N13.8 BENIGN PROSTATIC HYPERPLASIA WITH URINARY OBSTRUCTION: ICD-10-CM

## 2023-06-20 DIAGNOSIS — E03.9 HYPOTHYROIDISM, UNSPECIFIED TYPE: ICD-10-CM

## 2023-06-20 DIAGNOSIS — I10 PRIMARY HYPERTENSION: Primary | ICD-10-CM

## 2023-06-20 DIAGNOSIS — J30.9 ALLERGIC RHINITIS, UNSPECIFIED SEASONALITY, UNSPECIFIED TRIGGER: ICD-10-CM

## 2023-06-20 DIAGNOSIS — K21.9 GASTROESOPHAGEAL REFLUX DISEASE, UNSPECIFIED WHETHER ESOPHAGITIS PRESENT: ICD-10-CM

## 2023-06-20 DIAGNOSIS — G89.29 CHRONIC BILATERAL LOW BACK PAIN WITHOUT SCIATICA: ICD-10-CM

## 2023-06-20 DIAGNOSIS — I65.23 BILATERAL CAROTID ARTERY STENOSIS: ICD-10-CM

## 2023-06-20 DIAGNOSIS — K76.0 STEATOSIS OF LIVER: ICD-10-CM

## 2023-06-20 DIAGNOSIS — N40.1 BENIGN PROSTATIC HYPERPLASIA WITH URINARY OBSTRUCTION: ICD-10-CM

## 2023-06-20 DIAGNOSIS — M54.9 DORSALGIA, UNSPECIFIED: ICD-10-CM

## 2023-06-20 DIAGNOSIS — Z98.890 S/P CAROTID ENDARTERECTOMY: ICD-10-CM

## 2023-06-20 DIAGNOSIS — M54.50 CHRONIC BILATERAL LOW BACK PAIN WITHOUT SCIATICA: ICD-10-CM

## 2023-06-20 DIAGNOSIS — E78.2 MIXED HYPERLIPIDEMIA: ICD-10-CM

## 2023-06-20 PROBLEM — Z85.818 HISTORY OF CANCER TONSIL: Status: ACTIVE | Noted: 2023-06-20

## 2023-06-20 PROCEDURE — 3079F PR MOST RECENT DIASTOLIC BLOOD PRESSURE 80-89 MM HG: ICD-10-PCS | Mod: CPTII,,, | Performed by: NURSE PRACTITIONER

## 2023-06-20 PROCEDURE — 3079F DIAST BP 80-89 MM HG: CPT | Mod: CPTII,,, | Performed by: NURSE PRACTITIONER

## 2023-06-20 PROCEDURE — 1160F PR REVIEW ALL MEDS BY PRESCRIBER/CLIN PHARMACIST DOCUMENTED: ICD-10-PCS | Mod: CPTII,,, | Performed by: NURSE PRACTITIONER

## 2023-06-20 PROCEDURE — 1111F DSCHRG MED/CURRENT MED MERGE: CPT | Mod: CPTII,,, | Performed by: NURSE PRACTITIONER

## 2023-06-20 PROCEDURE — 99214 PR OFFICE/OUTPT VISIT, EST, LEVL IV, 30-39 MIN: ICD-10-PCS | Mod: S$PBB,,, | Performed by: NURSE PRACTITIONER

## 2023-06-20 PROCEDURE — 3008F PR BODY MASS INDEX (BMI) DOCUMENTED: ICD-10-PCS | Mod: CPTII,,, | Performed by: NURSE PRACTITIONER

## 2023-06-20 PROCEDURE — 1159F MED LIST DOCD IN RCRD: CPT | Mod: CPTII,,, | Performed by: NURSE PRACTITIONER

## 2023-06-20 PROCEDURE — 99214 OFFICE O/P EST MOD 30 MIN: CPT | Mod: PBBFAC | Performed by: NURSE PRACTITIONER

## 2023-06-20 PROCEDURE — 1160F RVW MEDS BY RX/DR IN RCRD: CPT | Mod: CPTII,,, | Performed by: NURSE PRACTITIONER

## 2023-06-20 PROCEDURE — 1111F PR DISCHARGE MEDS RECONCILED W/ CURRENT OUTPATIENT MED LIST: ICD-10-PCS | Mod: CPTII,,, | Performed by: NURSE PRACTITIONER

## 2023-06-20 PROCEDURE — 3074F SYST BP LT 130 MM HG: CPT | Mod: CPTII,,, | Performed by: NURSE PRACTITIONER

## 2023-06-20 PROCEDURE — 3074F PR MOST RECENT SYSTOLIC BLOOD PRESSURE < 130 MM HG: ICD-10-PCS | Mod: CPTII,,, | Performed by: NURSE PRACTITIONER

## 2023-06-20 PROCEDURE — 1159F PR MEDICATION LIST DOCUMENTED IN MEDICAL RECORD: ICD-10-PCS | Mod: CPTII,,, | Performed by: NURSE PRACTITIONER

## 2023-06-20 PROCEDURE — 99214 OFFICE O/P EST MOD 30 MIN: CPT | Mod: S$PBB,,, | Performed by: NURSE PRACTITIONER

## 2023-06-20 PROCEDURE — 3008F BODY MASS INDEX DOCD: CPT | Mod: CPTII,,, | Performed by: NURSE PRACTITIONER

## 2023-06-20 RX ORDER — ACETAMINOPHEN 160 MG/5ML
1000 LIQUID ORAL EVERY 6 HOURS PRN
Qty: 3750 ML | Refills: 3 | Status: SHIPPED | OUTPATIENT
Start: 2023-06-20

## 2023-06-20 RX ORDER — AMLODIPINE BESYLATE 10 MG/1
10 TABLET ORAL DAILY
Qty: 90 TABLET | Refills: 2 | Status: SHIPPED | OUTPATIENT
Start: 2023-06-20

## 2023-06-20 RX ORDER — CETIRIZINE HYDROCHLORIDE 10 MG/1
10 TABLET ORAL NIGHTLY
Qty: 90 TABLET | Refills: 3 | Status: SHIPPED | OUTPATIENT
Start: 2023-06-20 | End: 2023-09-20

## 2023-06-20 RX ORDER — LEVOTHYROXINE SODIUM 50 UG/1
50 TABLET ORAL
Qty: 90 TABLET | Refills: 2 | Status: SHIPPED | OUTPATIENT
Start: 2023-06-20 | End: 2024-01-09 | Stop reason: SDUPTHER

## 2023-06-20 RX ORDER — TRIPROLIDINE/PSEUDOEPHEDRINE 2.5MG-60MG
30 TABLET ORAL EVERY 8 HOURS PRN
Qty: 900 ML | Refills: 11 | Status: SHIPPED | OUTPATIENT
Start: 2023-06-20
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
## 2023-06-22 ENCOUNTER — OFFICE VISIT (OUTPATIENT)
Dept: CARDIAC SURGERY | Facility: CLINIC | Age: 63
End: 2023-06-22
Payer: MEDICARE

## 2023-06-22 VITALS
BODY MASS INDEX: 27.06 KG/M2 | WEIGHT: 189 LBS | HEART RATE: 72 BPM | SYSTOLIC BLOOD PRESSURE: 141 MMHG | HEIGHT: 70 IN | DIASTOLIC BLOOD PRESSURE: 89 MMHG | OXYGEN SATURATION: 98 %

## 2023-06-22 DIAGNOSIS — I65.22 LEFT CAROTID STENOSIS: Primary | ICD-10-CM

## 2023-06-22 PROCEDURE — 99024 PR POST-OP FOLLOW-UP VISIT: ICD-10-PCS | Mod: ,,, | Performed by: THORACIC SURGERY (CARDIOTHORACIC VASCULAR SURGERY)

## 2023-06-22 PROCEDURE — 3008F BODY MASS INDEX DOCD: CPT | Mod: CPTII,,, | Performed by: THORACIC SURGERY (CARDIOTHORACIC VASCULAR SURGERY)

## 2023-06-22 PROCEDURE — 3008F PR BODY MASS INDEX (BMI) DOCUMENTED: ICD-10-PCS | Mod: CPTII,,, | Performed by: THORACIC SURGERY (CARDIOTHORACIC VASCULAR SURGERY)

## 2023-06-22 PROCEDURE — 1159F MED LIST DOCD IN RCRD: CPT | Mod: CPTII,,, | Performed by: THORACIC SURGERY (CARDIOTHORACIC VASCULAR SURGERY)

## 2023-06-22 PROCEDURE — 3077F PR MOST RECENT SYSTOLIC BLOOD PRESSURE >= 140 MM HG: ICD-10-PCS | Mod: CPTII,,, | Performed by: THORACIC SURGERY (CARDIOTHORACIC VASCULAR SURGERY)

## 2023-06-22 PROCEDURE — 3077F SYST BP >= 140 MM HG: CPT | Mod: CPTII,,, | Performed by: THORACIC SURGERY (CARDIOTHORACIC VASCULAR SURGERY)

## 2023-06-22 PROCEDURE — 3079F DIAST BP 80-89 MM HG: CPT | Mod: CPTII,,, | Performed by: THORACIC SURGERY (CARDIOTHORACIC VASCULAR SURGERY)

## 2023-06-22 PROCEDURE — 1159F PR MEDICATION LIST DOCUMENTED IN MEDICAL RECORD: ICD-10-PCS | Mod: CPTII,,, | Performed by: THORACIC SURGERY (CARDIOTHORACIC VASCULAR SURGERY)

## 2023-06-22 PROCEDURE — 3079F PR MOST RECENT DIASTOLIC BLOOD PRESSURE 80-89 MM HG: ICD-10-PCS | Mod: CPTII,,, | Performed by: THORACIC SURGERY (CARDIOTHORACIC VASCULAR SURGERY)

## 2023-06-22 PROCEDURE — 99024 POSTOP FOLLOW-UP VISIT: CPT | Mod: ,,, | Performed by: THORACIC SURGERY (CARDIOTHORACIC VASCULAR SURGERY)

## 2023-06-22 NOTE — PROGRESS NOTES
"Subjective:      Patient ID: Dawson Gillette is a 63 y.o. male.    Chief Complaint: Post-op Evaluation (POST-OP 5/31/23-LT CEA W/ BOVINE PERICARDIAL PATCH REPAIR.  DX:  LT CAROTID STENOSIS.  No pain, Inc C/D/I edges well approx.  No redness , states mild numbness, but resolving)      HPI:  The patient feels well and has no complaints.      Current Outpatient Medications:     acetaminophen (TYLENOL) 160 mg/5 mL (5 mL) Soln, Take 31.25 mLs (1,000 mg total) by mouth every 6 (six) hours as needed (pain)., Disp: 3750 mL, Rfl: 3    amLODIPine (NORVASC) 10 MG tablet, Take 1 tablet (10 mg total) by mouth once daily., Disp: 90 tablet, Rfl: 2    aspirin (ECOTRIN) 81 MG EC tablet, Take 81 mg by mouth once daily., Disp: , Rfl:     azelastine (ASTELIN) 137 mcg (0.1 %) nasal spray, 1 spray (137 mcg total) by Nasal route 2 (two) times daily., Disp: 30 mL, Rfl: 3    cetirizine (ZYRTEC) 10 MG tablet, Take 1 tablet (10 mg total) by mouth every evening., Disp: 90 tablet, Rfl: 3    esomeprazole (NEXIUM) 40 mg GrPS, Take 40 mg by mouth before breakfast., Disp: 90 each, Rfl: 3    fluticasone propionate (FLONASE) 50 mcg/actuation nasal spray, 1 spray (50 mcg total) by Each Nostril route 2 (two) times a day., Disp: 16 g, Rfl: 6    ibuprofen 20 mg/mL oral liquid, Take 30 mLs (600 mg total) by mouth every 8 (eight) hours as needed for Pain., Disp: 900 mL, Rfl: 11    levothyroxine (SYNTHROID) 50 MCG tablet, Take 1 tablet (50 mcg total) by mouth before breakfast., Disp: 90 tablet, Rfl: 2    phenazopyridine HCl (AZO STANDARD MAXIMUM STRENGTH ORAL), Take 2 tablets by mouth 3 (three) times daily as needed. When prostatitis flares 2 tabs TID for 2 days, Disp: , Rfl:     rosuvastatin (CRESTOR) 40 MG Tab, Take 40 mg by mouth once daily., Disp: , Rfl:   Review of patient's allergies indicates:  No Known Allergies    BP (!) 141/89   Pulse 72   Ht 5' 10" (1.778 m)   Wt 85.7 kg (189 lb)   SpO2 98%   BMI 27.12 kg/m²     Review of Systems "   Constitutional: Negative.   HENT: Negative.    Eyes: Negative.    Cardiovascular: Negative.    Respiratory: Negative.    Endocrine: Negative.    Hematologic/Lymphatic: Negative.    Skin: Negative.    Musculoskeletal: Negative.    Gastrointestinal: Negative.    Genitourinary: Negative.    Neurological: Negative.    Psychiatric/Behavioral: Negative.    Allergic/Immunologic: Negative.        Objective:     Constitutional:  No acute distress  HENT:  Supple without mass.  Trachea midline.  No carotid bruits  Eyes:   Cardiovascular:  Regular rate and rhythm  Respiratory:  Clear to auscultation  Endocrine:  Hematologic/Lymphatic:   Skin:  Surgical incision well healed  Musculoskeletal:   Gastrointestinal:   Genitourinary:   Neurological:  Normal  Psychiatric/Behavioral:   Extremities:  No cyanosis clubbing or edema    Assessment:  Satisfactory recovery     Imaging:       Plan:  Return visit in 6 months with left carotid ultrasound

## 2023-06-28 ENCOUNTER — HOSPITAL ENCOUNTER (OUTPATIENT)
Dept: RADIOLOGY | Facility: HOSPITAL | Age: 63
Discharge: HOME | End: 2023-06-28
Attending: NURSE PRACTITIONER
Payer: MEDICARE

## 2023-06-28 DIAGNOSIS — M54.9 DORSALGIA, UNSPECIFIED: ICD-10-CM

## 2023-06-28 PROCEDURE — 72131 CT LUMBAR SPINE W/O DYE: CPT | Mod: TC

## 2023-07-03 ENCOUNTER — TELEPHONE (OUTPATIENT)
Dept: INTERNAL MEDICINE | Facility: CLINIC | Age: 63
End: 2023-07-03
Payer: MEDICARE

## 2023-07-03 DIAGNOSIS — M51.36 BULGING LUMBAR DISC: ICD-10-CM

## 2023-07-03 DIAGNOSIS — M48.061 SPINAL STENOSIS OF LUMBAR REGION, UNSPECIFIED WHETHER NEUROGENIC CLAUDICATION PRESENT: ICD-10-CM

## 2023-07-03 PROBLEM — M51.369 BULGING LUMBAR DISC: Status: ACTIVE | Noted: 2023-07-03

## 2023-07-03 NOTE — TELEPHONE ENCOUNTER
Contacted pt to inform of CT lumbar spine results. Referral to [facility] location per pt request. Pt will hold on neurosurgery/ pain mgmt referral at this time and will let provider know if he decides to pursue.

## 2023-09-08 ENCOUNTER — PATIENT MESSAGE (OUTPATIENT)
Dept: INTERNAL MEDICINE | Facility: CLINIC | Age: 63
End: 2023-09-08
Payer: MEDICARE

## 2023-09-11 PROBLEM — J01.10 ACUTE NON-RECURRENT FRONTAL SINUSITIS: Status: RESOLVED | Noted: 2023-06-06 | Resolved: 2023-09-11

## 2023-09-20 ENCOUNTER — OFFICE VISIT (OUTPATIENT)
Dept: INTERNAL MEDICINE | Facility: CLINIC | Age: 63
End: 2023-09-20
Payer: MEDICARE

## 2023-09-20 VITALS
HEIGHT: 70 IN | WEIGHT: 198.38 LBS | RESPIRATION RATE: 20 BRPM | DIASTOLIC BLOOD PRESSURE: 86 MMHG | SYSTOLIC BLOOD PRESSURE: 126 MMHG | HEART RATE: 67 BPM | TEMPERATURE: 98 F | BODY MASS INDEX: 28.4 KG/M2

## 2023-09-20 DIAGNOSIS — G89.29 CHRONIC BILATERAL LOW BACK PAIN WITHOUT SCIATICA: ICD-10-CM

## 2023-09-20 DIAGNOSIS — J30.9 ALLERGIC RHINITIS, UNSPECIFIED SEASONALITY, UNSPECIFIED TRIGGER: ICD-10-CM

## 2023-09-20 DIAGNOSIS — E03.9 HYPOTHYROIDISM, UNSPECIFIED TYPE: ICD-10-CM

## 2023-09-20 DIAGNOSIS — M48.061 SPINAL STENOSIS OF LUMBAR REGION, UNSPECIFIED WHETHER NEUROGENIC CLAUDICATION PRESENT: Primary | ICD-10-CM

## 2023-09-20 DIAGNOSIS — M54.50 CHRONIC BILATERAL LOW BACK PAIN WITHOUT SCIATICA: ICD-10-CM

## 2023-09-20 PROCEDURE — 1159F PR MEDICATION LIST DOCUMENTED IN MEDICAL RECORD: ICD-10-PCS | Mod: CPTII,,, | Performed by: NURSE PRACTITIONER

## 2023-09-20 PROCEDURE — 3008F BODY MASS INDEX DOCD: CPT | Mod: CPTII,,, | Performed by: NURSE PRACTITIONER

## 2023-09-20 PROCEDURE — 1159F MED LIST DOCD IN RCRD: CPT | Mod: CPTII,,, | Performed by: NURSE PRACTITIONER

## 2023-09-20 PROCEDURE — 3008F PR BODY MASS INDEX (BMI) DOCUMENTED: ICD-10-PCS | Mod: CPTII,,, | Performed by: NURSE PRACTITIONER

## 2023-09-20 PROCEDURE — 3074F PR MOST RECENT SYSTOLIC BLOOD PRESSURE < 130 MM HG: ICD-10-PCS | Mod: CPTII,,, | Performed by: NURSE PRACTITIONER

## 2023-09-20 PROCEDURE — 99215 OFFICE O/P EST HI 40 MIN: CPT | Mod: PBBFAC | Performed by: NURSE PRACTITIONER

## 2023-09-20 PROCEDURE — 3079F DIAST BP 80-89 MM HG: CPT | Mod: CPTII,,, | Performed by: NURSE PRACTITIONER

## 2023-09-20 PROCEDURE — 99214 PR OFFICE/OUTPT VISIT, EST, LEVL IV, 30-39 MIN: ICD-10-PCS | Mod: S$PBB,,, | Performed by: NURSE PRACTITIONER

## 2023-09-20 PROCEDURE — 99214 OFFICE O/P EST MOD 30 MIN: CPT | Mod: S$PBB,,, | Performed by: NURSE PRACTITIONER

## 2023-09-20 PROCEDURE — 3079F PR MOST RECENT DIASTOLIC BLOOD PRESSURE 80-89 MM HG: ICD-10-PCS | Mod: CPTII,,, | Performed by: NURSE PRACTITIONER

## 2023-09-20 PROCEDURE — 3074F SYST BP LT 130 MM HG: CPT | Mod: CPTII,,, | Performed by: NURSE PRACTITIONER

## 2023-09-20 RX ORDER — CYCLOBENZAPRINE HCL 5 MG
5 TABLET ORAL 3 TIMES DAILY PRN
Qty: 30 TABLET | Refills: 1 | Status: SHIPPED | OUTPATIENT
Start: 2023-09-20

## 2023-09-20 RX ORDER — CETIRIZINE HYDROCHLORIDE 1 MG/ML
10 SOLUTION ORAL DAILY
Qty: 300 ML | Refills: 11 | Status: SHIPPED | OUTPATIENT
Start: 2023-09-20 | End: 2024-09-19

## 2023-09-20 RX ORDER — MELOXICAM 15 MG/1
15 TABLET ORAL DAILY
Qty: 30 TABLET | Refills: 1 | Status: SHIPPED | OUTPATIENT
Start: 2023-09-20
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
## 2023-11-14 ENCOUNTER — PATIENT OUTREACH (OUTPATIENT)
Dept: ADMINISTRATIVE | Facility: OTHER | Age: 63
End: 2023-11-14
Payer: MEDICARE

## 2023-11-14 NOTE — PROGRESS NOTES
CHW - Outreach Attempt    Community Health Worker r 1st attempt to contact patient regarding: sdoh  Community Health Worker to attempt to contact patient on: [phone], requested a call back.

## 2023-11-14 NOTE — PROGRESS NOTES
This Community Health Worker (CHW) completed Social Determinant of Health (SDOH)  Questionnaire with patient/caregiver via telephone today.     Patient denied any SDOH needs at this time. Have completed his Physical therapy with an provider outside [facility]. CHW informed of Np number,  Will follow up with Np, Lea Hunter,

## 2024-01-06 ENCOUNTER — LAB VISIT (OUTPATIENT)
Dept: LAB | Facility: HOSPITAL | Age: 64
End: 2024-01-06
Attending: NURSE PRACTITIONER
Payer: MEDICARE

## 2024-01-06 DIAGNOSIS — E03.9 HYPOTHYROIDISM, UNSPECIFIED TYPE: ICD-10-CM

## 2024-01-06 LAB — TSH SERPL-ACNC: 1.07 UIU/ML (ref 0.35–4.94)

## 2024-01-06 PROCEDURE — 36415 COLL VENOUS BLD VENIPUNCTURE: CPT

## 2024-01-06 PROCEDURE — 84443 ASSAY THYROID STIM HORMONE: CPT

## 2024-01-09 ENCOUNTER — PATIENT MESSAGE (OUTPATIENT)
Dept: INTERNAL MEDICINE | Facility: CLINIC | Age: 64
End: 2024-01-09

## 2024-01-09 ENCOUNTER — OFFICE VISIT (OUTPATIENT)
Dept: INTERNAL MEDICINE | Facility: CLINIC | Age: 64
End: 2024-01-09
Payer: MEDICARE

## 2024-01-09 VITALS
RESPIRATION RATE: 16 BRPM | BODY MASS INDEX: 28.03 KG/M2 | TEMPERATURE: 98 F | HEIGHT: 70 IN | SYSTOLIC BLOOD PRESSURE: 122 MMHG | HEART RATE: 71 BPM | WEIGHT: 195.81 LBS | DIASTOLIC BLOOD PRESSURE: 75 MMHG

## 2024-01-09 DIAGNOSIS — E03.9 HYPOTHYROIDISM, UNSPECIFIED TYPE: Primary | ICD-10-CM

## 2024-01-09 DIAGNOSIS — M51.36 BULGING LUMBAR DISC: ICD-10-CM

## 2024-01-09 DIAGNOSIS — N41.1 CHRONIC PROSTATITIS: ICD-10-CM

## 2024-01-09 DIAGNOSIS — R15.1 FECAL SMEARING: ICD-10-CM

## 2024-01-09 DIAGNOSIS — I10 PRIMARY HYPERTENSION: ICD-10-CM

## 2024-01-09 DIAGNOSIS — M48.061 SPINAL STENOSIS OF LUMBAR REGION, UNSPECIFIED WHETHER NEUROGENIC CLAUDICATION PRESENT: ICD-10-CM

## 2024-01-09 DIAGNOSIS — E78.2 MIXED HYPERLIPIDEMIA: ICD-10-CM

## 2024-01-09 DIAGNOSIS — F41.8 ANXIETY ABOUT HEALTH: ICD-10-CM

## 2024-01-09 DIAGNOSIS — R45.89 ANXIETY ABOUT HEALTH: ICD-10-CM

## 2024-01-09 PROCEDURE — 99214 OFFICE O/P EST MOD 30 MIN: CPT | Mod: S$PBB,,, | Performed by: NURSE PRACTITIONER

## 2024-01-09 PROCEDURE — 99215 OFFICE O/P EST HI 40 MIN: CPT | Mod: PBBFAC | Performed by: NURSE PRACTITIONER

## 2024-01-09 RX ORDER — LEVOTHYROXINE SODIUM 50 UG/1
50 TABLET ORAL
Qty: 90 TABLET | Refills: 2 | Status: SHIPPED | OUTPATIENT
Start: 2024-01-09

## 2024-01-09 RX ORDER — SULFAMETHOXAZOLE AND TRIMETHOPRIM 800; 160 MG/1; MG/1
1 TABLET ORAL
COMMUNITY

## 2024-01-09 NOTE — ASSESSMENT & PLAN NOTE
Patient with chronic prostatitis.  He is establish with external urologist.  He is currently on Bactrim however questionable compliance for appropriate dose amount and regular use.  He states he has stopped it.  He states he was taking less than recommended because he was unsure if the amount prescribed was too much.  He does admit while taking medication he does feel better.  He has follow up with his urologist in April of this year and plans to keep appointment and/or we will contact urology office for sooner follow-up if available.  Patient mentioned wanting a 2nd opinion and will let PCP know if he decides for referral to be sent here to [facility].

## 2024-01-09 NOTE — ASSESSMENT & PLAN NOTE
6/28/23 CT Lumbar Spine  FINDINGS:  There are 4 non rib-bearing lumbar vertebrae.  For the purpose of this report, the most inferior well-formed intervertebral disc space is designated to be L5-S1.  Lumbar vertebrae stature is preserved and the alignment is unremarkable.  There are minimal anterior degenerative hypertrophic spurrings.  No fractures or malalignment.  Calcified plaques of the abdominal aorta and the iliac arteries.  Extensive noninflamed diverticulosis coli involves the sigmoid colon.  Disc segmental analysis is given below:     At L1-L2, disc is unremarkable.  Bilateral mild facet arthropathy.  Central canal is not stenosed and there are no narrowings of the neural foramen.     At L2-L3, disc is unremarkable.  Bilateral minimal facet arthropathy.  Central canal is not stenosed.  There are no narrowings of the neural foramen.     At L3-L4, there is disc bulge which flattens the ventral thecal sac.  Bilateral ligamentum flavum thickening and facet arthropathy.  These findings cause mild central canal stenosis.  There are no narrowings of the neural foramen.     At L4-L5, there is generalized disc bulge which flattens mildly compresses the ventral thecal sac.  There is also thickening of ligamentum flavum and facet arthropathy.  Central canal stenosis is mild.  Right neural foramen is patent.  There is mild narrowing of the proximal left neural foramen.     At L5-S1, there is generalized disc bulge mildly lateralizes into the left neural foramen.  There is bilateral facet arthropathy.  No significant central canal stenosis.  Right neural foramen is patent.  There is mild narrowing of the left neural foramen     Impression:     Lumbar degenerative disc disease and spondylosis level by level discussed above.    Patient completed PT with no improvement in symptoms.  He has since stopped weight lifting which has helped his symptoms.  He has prescription for Flexeril and meloxicam to be used p.r.n..  We will  send referral to Dr. Dr. Burgos (per patient request 1/29/24) for further evaluation and treatment management
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
## 2024-01-09 NOTE — ASSESSMENT & PLAN NOTE
Patient with multiple concerns related to his health causing anxiety and stress.  Patient's significant other is present in agrees that patient worries a lot about his health and believes his previous cancer we will return at some point as well as all his issues are related to his previous cancer.  Reassurance provided to patient regarding his concerns expressed today.  Plan in place that patient will continue follow up with his specialist and if any assistance needed from PCP patient will contact PCP for further referrals and/or recommendations.  Patient and significant other agreed with treatment plan discussed today.

## 2024-01-25 ENCOUNTER — TELEPHONE (OUTPATIENT)
Dept: INTERNAL MEDICINE | Facility: CLINIC | Age: 64
End: 2024-01-25
Payer: MEDICARE

## 2024-01-25 NOTE — TELEPHONE ENCOUNTER
Please let pt know unfortunately the referral was denied by Dr. Wilson. Does patient have preference of another provider he would like to be referred to? He can contact insurance company for assistance in locating an accepting provider who will take his insurance and I can order the referral once provider is found. If not, he can be referred to [place] or [facility] at [facility]. Please let me know his response as this should be done in a timely manner.     Thank you

## 2024-01-26 NOTE — TELEPHONE ENCOUNTER
Spoke Mr Osman informed & reports he will contact his insurance company & informed he will call us back with provider.

## 2024-01-29 ENCOUNTER — TELEPHONE (OUTPATIENT)
Dept: INTERNAL MEDICINE | Facility: CLINIC | Age: 64
End: 2024-01-29
Payer: MEDICARE

## 2024-01-29 DIAGNOSIS — M48.061 SPINAL STENOSIS OF LUMBAR REGION, UNSPECIFIED WHETHER NEUROGENIC CLAUDICATION PRESENT: Primary | ICD-10-CM

## 2024-01-29 DIAGNOSIS — M51.36 BULGING LUMBAR DISC: ICD-10-CM

## 2024-01-29 NOTE — TELEPHONE ENCOUNTER
----- Message from Ingrid Mario LPN sent at 1/29/2024 10:18 AM CST -----  Regarding: FW: referral  Pt states we had referred to Dr. Wilson on 1/24 and it was denied due to insurance. Pt states that he spoke to insurance and Dr. Burgos will accept insurance and is in network. Requesting referral be sent to Dr. Burgos's office  ----- Message -----  From: Helen Douglas  Sent: 1/29/2024   9:26 AM CST  To: [facility]ITMORE Staff  Subject: referral                                         Type:  Patient Requesting Referral    Who Called:pt's significant other  Referral to What Specialty:neuro surgeon  Reason for Referral:  Does the patient want the referral with a specific physician?:Dr Nato Burgos  Is the specialist an [facility]?:  Patient Requesting a Response?:yes  Would the patient rather a call back or a response via [facility]? C/b  Best Call Back Number:[phone]    Additional Information: dr Burgos tel #  [phone]    Pt req as soon as possible

## 2024-01-29 NOTE — TELEPHONE ENCOUNTER
Please let pt know referral to Dr. Burgos ordered. Please process with lov note 1/9/24 and CT lumbar spine results 6/2023.    Thank you

## 2024-01-29 NOTE — TELEPHONE ENCOUNTER
All documents have been faxed, will scan confirmation once received. LVM in regards referral being ordered.

## 2024-05-01 ENCOUNTER — LAB VISIT (OUTPATIENT)
Dept: LAB | Facility: HOSPITAL | Age: 64
End: 2024-05-01
Attending: FAMILY MEDICINE
Payer: MEDICARE

## 2024-05-01 DIAGNOSIS — I65.29 CAROTID ARTERY STENOSIS: ICD-10-CM

## 2024-05-01 DIAGNOSIS — J30.9 SPASMODIC RHINORRHEA: Primary | ICD-10-CM

## 2024-05-01 DIAGNOSIS — M54.9 DORSALGIA: ICD-10-CM

## 2024-05-01 DIAGNOSIS — K21.9 GASTROESOPHAGEAL REFLUX DISEASE, UNSPECIFIED WHETHER ESOPHAGITIS PRESENT: ICD-10-CM

## 2024-05-01 LAB
ALBUMIN SERPL-MCNC: 4.4 G/DL (ref 3.4–4.8)
ALBUMIN/GLOB SERPL: 1.6 RATIO (ref 1.1–2)
ALP SERPL-CCNC: 93 UNIT/L (ref 40–150)
ALT SERPL-CCNC: 23 UNIT/L (ref 0–55)
APPEARANCE UR: CLEAR
AST SERPL-CCNC: 16 UNIT/L (ref 5–34)
BACTERIA #/AREA URNS AUTO: NORMAL /HPF
BILIRUB SERPL-MCNC: 0.5 MG/DL
BILIRUB UR QL STRIP.AUTO: NEGATIVE
BUN SERPL-MCNC: 10.1 MG/DL (ref 8.4–25.7)
CALCIUM SERPL-MCNC: 9.5 MG/DL (ref 8.8–10)
CHLORIDE SERPL-SCNC: 107 MMOL/L (ref 98–107)
CHOLEST SERPL-MCNC: 109 MG/DL
CHOLEST/HDLC SERPL: 3 {RATIO} (ref 0–5)
CO2 SERPL-SCNC: 28 MMOL/L (ref 23–31)
COLOR UR AUTO: YELLOW
CREAT SERPL-MCNC: 0.79 MG/DL (ref 0.73–1.18)
ERYTHROCYTE [DISTWIDTH] IN BLOOD BY AUTOMATED COUNT: 11.7 % (ref 11.5–17)
GFR SERPLBLD CREATININE-BSD FMLA CKD-EPI: >60 MLS/MIN/1.73/M2
GLOBULIN SER-MCNC: 2.7 GM/DL (ref 2.4–3.5)
GLUCOSE SERPL-MCNC: 115 MG/DL (ref 82–115)
GLUCOSE UR QL STRIP.AUTO: NEGATIVE
HCT VFR BLD AUTO: 40.7 % (ref 42–52)
HDLC SERPL-MCNC: 34 MG/DL (ref 35–60)
HGB BLD-MCNC: 14.2 G/DL (ref 14–18)
KETONES UR QL STRIP.AUTO: NEGATIVE
LDLC SERPL CALC-MCNC: 30 MG/DL (ref 50–140)
LEUKOCYTE ESTERASE UR QL STRIP.AUTO: NEGATIVE
MCH RBC QN AUTO: 31.6 PG (ref 27–31)
MCHC RBC AUTO-ENTMCNC: 34.9 G/DL (ref 33–36)
MCV RBC AUTO: 90.6 FL (ref 80–94)
NITRITE UR QL STRIP.AUTO: NEGATIVE
PH UR STRIP.AUTO: 7 [PH]
PLATELET # BLD AUTO: 125 X10(3)/MCL (ref 130–400)
PMV BLD AUTO: 12.1 FL (ref 7.4–10.4)
POTASSIUM SERPL-SCNC: 3.8 MMOL/L (ref 3.5–5.1)
PROT SERPL-MCNC: 7.1 GM/DL (ref 5.8–7.6)
PROT UR QL STRIP.AUTO: NEGATIVE
RBC # BLD AUTO: 4.49 X10(6)/MCL (ref 4.7–6.1)
RBC #/AREA URNS AUTO: NORMAL /HPF
RBC UR QL AUTO: ABNORMAL
SODIUM SERPL-SCNC: 143 MMOL/L (ref 136–145)
SP GR UR STRIP.AUTO: 1.02 (ref 1–1.03)
SQUAMOUS #/AREA URNS AUTO: NORMAL /HPF
TRIGL SERPL-MCNC: 227 MG/DL (ref 34–140)
TSH SERPL-ACNC: 2.33 UIU/ML (ref 0.35–4.94)
UROBILINOGEN UR STRIP-ACNC: 0.2
VLDLC SERPL CALC-MCNC: 45 MG/DL
WBC # SPEC AUTO: 8.66 X10(3)/MCL (ref 4.5–11.5)
WBC #/AREA URNS AUTO: NORMAL /HPF

## 2024-05-01 PROCEDURE — 81001 URINALYSIS AUTO W/SCOPE: CPT

## 2024-05-01 PROCEDURE — 84443 ASSAY THYROID STIM HORMONE: CPT

## 2024-05-01 PROCEDURE — 80061 LIPID PANEL: CPT

## 2024-05-01 PROCEDURE — 80053 COMPREHEN METABOLIC PANEL: CPT

## 2024-05-01 PROCEDURE — 36415 COLL VENOUS BLD VENIPUNCTURE: CPT

## 2024-05-01 PROCEDURE — 85027 COMPLETE CBC AUTOMATED: CPT

## 2024-10-10 ENCOUNTER — LAB VISIT (OUTPATIENT)
Dept: LAB | Facility: HOSPITAL | Age: 64
End: 2024-10-10
Attending: FAMILY MEDICINE
Payer: MEDICARE

## 2024-10-10 DIAGNOSIS — J30.9 RHINITIS, ALLERGIC: Primary | ICD-10-CM

## 2024-10-10 PROCEDURE — 82785 ASSAY OF IGE: CPT

## 2024-10-10 PROCEDURE — 86003 ALLG SPEC IGE CRUDE XTRC EA: CPT

## 2024-10-10 PROCEDURE — 82785 ASSAY OF IGE: CPT | Mod: 59

## 2024-10-10 PROCEDURE — 86003 ALLG SPEC IGE CRUDE XTRC EA: CPT | Mod: 59

## 2024-10-10 PROCEDURE — 36415 COLL VENOUS BLD VENIPUNCTURE: CPT

## 2024-10-14 LAB
ALLERGEN ALMOND IGE (OLG): <0.1 KUA/L
ALLERGEN ALTERNARIA ALTERNATA IGE (OLG): <0.1 KUA/L
ALLERGEN ASPERGILLUS FUMIGATUS IGE (OLG): <0.1 KUA/L
ALLERGEN BERMUDA GRASS IGE (OLG): <0.1 KUA/L
ALLERGEN BOXELDER MAPLE TREE IGE (OLG): <0.1 KUA/L
ALLERGEN CASHEW NUT IGE (OLG): <0.1 KUA/L
ALLERGEN CAT DANDER IGE (OLG): <0.1 KUA/L
ALLERGEN CLADOSPORIUM HERBARUM IGE (OLG): <0.1 KUA/L
ALLERGEN COCKROACH GERMAN IGE (OLG): <0.1 KUA/L
ALLERGEN CODFISH IGE (OLG): <0.1 KUA/L
ALLERGEN COMMON RAGWEED IGE (OLG): <0.1 KUA/L
ALLERGEN CRAB IGE (OLG): <0.1 KUA/L
ALLERGEN DOG DANDER IGE (OLG): <0.1 KUA/L
ALLERGEN DUST MITE (D. PTERONYSSINUS) IGE (OLG): <0.1 KUA/L
ALLERGEN DUST MITE (D.FARINAE) IGE (OLG): <0.1 KUA/L
ALLERGEN EGG WHITE IGE (OLG): <0.1 KUA/L
ALLERGEN EGG YOLK IGE (OLG): <0.1 KUA/L
ALLERGEN ELM TREE IGE (OLG): <0.1 KUA/L
ALLERGEN HAZELNUT IGE (OLG): <0.1 KUA/L
ALLERGEN HORSE DANDER IGE (OLG): <0.1 KUA/L
ALLERGEN MILK IGE (OLG): <0.1 KUA/L
ALLERGEN MOUNTAIN JUNIPER TREE IGE (OLG): 0.22 KUA/L
ALLERGEN MOUSE URINE PROTEINS IGE (OLG): <0.1 KUA/L
ALLERGEN MULBERRY TREE IGE (OLG): <0.1 KUA/L
ALLERGEN OAK TREE IGE (OLG): <0.1 KUA/L
ALLERGEN PEANUT IGE (OLG): <0.1 KUA/L
ALLERGEN PECAN HICKORY TREE IGE (OLG): <0.1 KUA/L
ALLERGEN PENICILLIUM CHRYSOGENUM IGE (OLG): <0.1 KUA/L
ALLERGEN PIGWEED IGE (OLG): <0.1 KUA/L
ALLERGEN ROUGH MARSH ELDER IGE (OLG): <0.1 KUA/L
ALLERGEN SALMON IGE (OLG): <0.1 KUA/L
ALLERGEN SCALLOP IGE (OLG): <0.1 KUA/L
ALLERGEN SESAME SEED IGE (OLG): <0.1 KUA/L
ALLERGEN SHRIMP IGE (OLG): <0.1 KUA/L
ALLERGEN SILVER BIRCH TREE IGE (OLG): <0.1 KUA/L
ALLERGEN SOY BEAN IGE (OLG): <0.1 KUA/L
ALLERGEN TIMOTHY GRASS IGE (OLG): <0.1 KUA/L
ALLERGEN TUNA IGE (OLG): <0.1 KUA/L
ALLERGEN WALNUT IGE (OLG): <0.1 KUA/L
ALLERGEN WALNUT TREE IGE (OLG): <0.1 KUA/L
ALLERGEN WHEAT IGE (OLG): <0.1 KUA/L
PHADIOTOP IGE QN: 224 KU/L
PHADIOTOP IGE QN: 224 KU/L

## 2025-03-13 ENCOUNTER — OFFICE VISIT (OUTPATIENT)
Dept: CARDIAC SURGERY | Facility: CLINIC | Age: 65
End: 2025-03-13
Payer: MEDICARE

## 2025-03-13 DIAGNOSIS — I65.23 BILATERAL CAROTID ARTERY STENOSIS: Primary | ICD-10-CM

## 2025-03-13 PROCEDURE — 99215 OFFICE O/P EST HI 40 MIN: CPT | Mod: ,,, | Performed by: THORACIC SURGERY (CARDIOTHORACIC VASCULAR SURGERY)

## 2025-03-13 PROCEDURE — 3044F HG A1C LEVEL LT 7.0%: CPT | Mod: CPTII,,, | Performed by: THORACIC SURGERY (CARDIOTHORACIC VASCULAR SURGERY)

## 2025-03-13 PROCEDURE — 1160F RVW MEDS BY RX/DR IN RCRD: CPT | Mod: CPTII,,, | Performed by: THORACIC SURGERY (CARDIOTHORACIC VASCULAR SURGERY)

## 2025-03-13 PROCEDURE — 1159F MED LIST DOCD IN RCRD: CPT | Mod: CPTII,,, | Performed by: THORACIC SURGERY (CARDIOTHORACIC VASCULAR SURGERY)

## 2025-05-13 DIAGNOSIS — Z85.89 HISTORY OF SQUAMOUS CELL CARCINOMA: Primary | ICD-10-CM

## 2025-05-13 DIAGNOSIS — R31.9 HEMATURIA: Primary | ICD-10-CM

## 2025-05-16 ENCOUNTER — HOSPITAL ENCOUNTER (OUTPATIENT)
Dept: RADIOLOGY | Facility: HOSPITAL | Age: 65
Discharge: HOME | End: 2025-05-16
Attending: FAMILY MEDICINE
Payer: MEDICARE

## 2025-05-16 DIAGNOSIS — Z85.89 HISTORY OF SQUAMOUS CELL CARCINOMA: ICD-10-CM

## 2025-05-16 DIAGNOSIS — R31.9 HEMATURIA: ICD-10-CM

## 2025-05-16 PROCEDURE — 76857 US EXAM PELVIC LIMITED: CPT | Mod: TC

## 2025-05-16 PROCEDURE — 70450 CT HEAD/BRAIN W/O DYE: CPT | Mod: TC

## 2025-08-14 ENCOUNTER — HOSPITAL ENCOUNTER (OUTPATIENT)
Dept: RADIOLOGY | Facility: HOSPITAL | Age: 65
Discharge: HOME | End: 2025-08-14
Attending: FAMILY MEDICINE
Payer: MEDICARE

## 2025-08-14 DIAGNOSIS — M54.50 LOW BACK PAIN, UNSPECIFIED: Primary | ICD-10-CM

## 2025-08-14 DIAGNOSIS — M54.50 LOW BACK PAIN, UNSPECIFIED: ICD-10-CM

## 2025-08-14 PROCEDURE — 72100 X-RAY EXAM L-S SPINE 2/3 VWS: CPT | Mod: TC

## (undated) DEVICE — Device

## (undated) DEVICE — DRAPE SLUSH WARMER 66X44IN

## (undated) DEVICE — ELECTRODE PATIENT RETURN DISP

## (undated) DEVICE — SUT PROLENE 6-0 C-1 30IN BL

## (undated) DEVICE — TUBE SUCTION MEDI-VAC STERILE

## (undated) DEVICE — SUT SILK 2.0 BLK 18

## (undated) DEVICE — ADHESIVE DERMABOND ADVANCED

## (undated) DEVICE — DRAIN FLAT JP 7X4MM FUL

## (undated) DEVICE — DRESSING TEGADERM 4.4X5IN

## (undated) DEVICE — CANNULA VESSEL

## (undated) DEVICE — CLIPS LIGATING TITANIUM WDE SM

## (undated) DEVICE — GLOVE SURG BIOGEL LATEX SZ 7.5

## (undated) DEVICE — RESERVOIR JACKSON-PRATT 100CC

## (undated) DEVICE — SUT VICRYL 3-0 27 SH

## (undated) DEVICE — YANKAUER OPEN TIP W/O VENT

## (undated) DEVICE — SUT VICRYL 2-0 36 CT-1

## (undated) DEVICE — GLOVE PROTEXIS PI SYN SURG 7.5

## (undated) DEVICE — WIPE MERCL POLYVIL ACETL 3X3IN

## (undated) DEVICE — NDL HYPO A BEVEL 30X1/2

## (undated) DEVICE — DURAPREP SURG SCRUB 26ML

## (undated) DEVICE — CLIP JAW SPRING DBL SFT 6MM

## (undated) DEVICE — SYR 30CC LUER LOCK

## (undated) DEVICE — DRAPE IOBAN 6635

## (undated) DEVICE — SUT 3-0 MONOCRYL PLUS PS-2

## (undated) DEVICE — DRESSING TELFA + BARR 4X6IN

## (undated) DEVICE — GLOVE SURGICAL LATEX SZ 8

## (undated) DEVICE — SOL NORMAL USPCA 0.9%

## (undated) DEVICE — DRESSING TELFA N ADH 3X8

## (undated) DEVICE — GLOVE PROTEXIS BLUE LATEX 6.5

## (undated) DEVICE — GLOVE PROTEXIS HYDROGEL SZ6.5

## (undated) DEVICE — KIT SURGICAL TURNOVER

## (undated) DEVICE — BAG MEDI-PLAST DECANTER C-FLOW